# Patient Record
Sex: FEMALE | Race: BLACK OR AFRICAN AMERICAN | HISPANIC OR LATINO | URBAN - METROPOLITAN AREA
[De-identification: names, ages, dates, MRNs, and addresses within clinical notes are randomized per-mention and may not be internally consistent; named-entity substitution may affect disease eponyms.]

---

## 2017-06-12 ENCOUNTER — EMERGENCY (EMERGENCY)
Facility: HOSPITAL | Age: 37
LOS: 0 days | Discharge: HOME | End: 2017-06-12

## 2017-06-12 DIAGNOSIS — O41.00X0 OLIGOHYDRAMNIOS, UNSPECIFIED TRIMESTER, NOT APPLICABLE OR UNSPECIFIED: ICD-10-CM
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
## 2017-06-28 DIAGNOSIS — F10.129 ALCOHOL ABUSE WITH INTOXICATION, UNSPECIFIED: ICD-10-CM

## 2017-06-28 DIAGNOSIS — Z98.890 OTHER SPECIFIED POSTPROCEDURAL STATES: ICD-10-CM

## 2017-09-10 ENCOUNTER — EMERGENCY (EMERGENCY)
Facility: HOSPITAL | Age: 37
LOS: 0 days | Discharge: HOME | End: 2017-09-10

## 2017-09-10 DIAGNOSIS — N83.201 UNSPECIFIED OVARIAN CYST, RIGHT SIDE: ICD-10-CM

## 2017-09-10 DIAGNOSIS — Z98.890 OTHER SPECIFIED POSTPROCEDURAL STATES: ICD-10-CM

## 2017-09-10 DIAGNOSIS — R10.33 PERIUMBILICAL PAIN: ICD-10-CM

## 2017-09-10 DIAGNOSIS — Z79.899 OTHER LONG TERM (CURRENT) DRUG THERAPY: ICD-10-CM

## 2017-09-10 DIAGNOSIS — O41.00X0 OLIGOHYDRAMNIOS, UNSPECIFIED TRIMESTER, NOT APPLICABLE OR UNSPECIFIED: ICD-10-CM

## 2017-10-20 ENCOUNTER — EMERGENCY (EMERGENCY)
Facility: HOSPITAL | Age: 37
LOS: 0 days | Discharge: HOME | End: 2017-10-20

## 2017-10-20 DIAGNOSIS — O41.00X0 OLIGOHYDRAMNIOS, UNSPECIFIED TRIMESTER, NOT APPLICABLE OR UNSPECIFIED: ICD-10-CM

## 2017-10-20 DIAGNOSIS — J02.9 ACUTE PHARYNGITIS, UNSPECIFIED: ICD-10-CM

## 2017-10-20 DIAGNOSIS — B34.9 VIRAL INFECTION, UNSPECIFIED: ICD-10-CM

## 2017-10-20 DIAGNOSIS — Z98.890 OTHER SPECIFIED POSTPROCEDURAL STATES: ICD-10-CM

## 2018-02-05 ENCOUNTER — EMERGENCY (EMERGENCY)
Facility: HOSPITAL | Age: 38
LOS: 0 days | Discharge: HOME | End: 2018-02-05

## 2018-02-05 VITALS
HEART RATE: 79 BPM | SYSTOLIC BLOOD PRESSURE: 121 MMHG | RESPIRATION RATE: 17 BRPM | DIASTOLIC BLOOD PRESSURE: 56 MMHG | OXYGEN SATURATION: 100 % | TEMPERATURE: 99 F
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
## 2018-02-05 DIAGNOSIS — M79.672 PAIN IN LEFT FOOT: ICD-10-CM
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
## 2018-02-05 DIAGNOSIS — S92.902A UNSPECIFIED FRACTURE OF LEFT FOOT, INITIAL ENCOUNTER FOR CLOSED FRACTURE: ICD-10-CM

## 2018-02-05 RX ORDER — IBUPROFEN 200 MG
600 TABLET ORAL ONCE
Qty: 0 | Refills: 0 | Status: COMPLETED | OUTPATIENT
Start: 2018-02-05 | End: 2018-02-05

## 2018-02-05 RX ORDER — IBUPROFEN 200 MG
1 TABLET ORAL
Qty: 42 | Refills: 0 | OUTPATIENT
Start: 2018-02-05 | End: 2018-02-18

## 2018-02-05 RX ADMIN — Medication 600 MILLIGRAM(S): at 18:44

## 2018-02-05 NOTE — ED PROVIDER NOTE - OBJECTIVE STATEMENT
37 y.o. female presented to ER c/o pain dorsum Left foot after hitting foot on bed frame last night.  Pain progressed throughout the day.  Pt denies weakness, paresthesias, other injury.

## 2018-02-05 NOTE — ED PROVIDER NOTE - MUSCULOSKELETAL, MLM
LEFT FOOT:  (+) tenderness, swelling, and ecchymosis dorsum foot base 1st and second metatarsal; limited ROM due to pain; no motor or sensory deficit; pedal pulses 2+

## 2018-02-05 NOTE — ED PROCEDURE NOTE - CPROC ED POST PROC CARE GUIDE1
Elevate the injured extremity as instructed./Keep the cast/splint/dressing clean and dry./Verbal/written post procedure instructions were given to patient/caregiver.

## 2018-07-08 ENCOUNTER — EMERGENCY (EMERGENCY)
Facility: HOSPITAL | Age: 38
LOS: 0 days | Discharge: HOME | End: 2018-07-08
Admitting: PHYSICIAN ASSISTANT

## 2018-07-08 VITALS
RESPIRATION RATE: 18 BRPM | TEMPERATURE: 98 F | HEART RATE: 74 BPM | SYSTOLIC BLOOD PRESSURE: 140 MMHG | OXYGEN SATURATION: 100 % | DIASTOLIC BLOOD PRESSURE: 76 MMHG

## 2018-07-08 DIAGNOSIS — Z79.899 OTHER LONG TERM (CURRENT) DRUG THERAPY: ICD-10-CM

## 2018-07-08 DIAGNOSIS — R11.0 NAUSEA: ICD-10-CM

## 2018-07-08 NOTE — ED PROVIDER NOTE - PHYSICAL EXAMINATION
Gen: Alert, NAD, well appearing  Head: NC, AT, PERRL, EOMI, normal lids/conjunctiva  Neck: +supple, no tenderness/meningismus,  Pulm: Bilateral BS, normal resp effort, no wheeze/stridor/retractions  CV: RRR, no murmer  Abd: soft, NT/ND,  no organomegaly  Mskel: no edema/erythema/cyanosis  Skin: no rash, warm/dry  Neuro: AAOx3, no sensory/motor deficits

## 2018-07-08 NOTE — ED ADULT NURSE NOTE - OBJECTIVE STATEMENT
Patient c/o nausea x 1 week. States she had 1 episode of lower abdominal pain yesterday which resolved on its own. LMP 6/13 believes she might be pregnant Patient c/o nausea x 1 week. LMP 6/13 believes she might be pregnant

## 2018-07-08 NOTE — ED PROVIDER NOTE - OBJECTIVE STATEMENT
38 yo female requesting pregnancy test. Patient states she has had some nausea for a few days and thinks she may be pregnant. LMP 6/13/18. No f/c/v/c/d. No abdominal pains. No vaginal bleeding. 36 yo female requesting pregnancy test. Patient states she has had some nausea for a few days and thinks she may be pregnant. LMP 6/13/18. No f/c/v/c/d. No HA, CP,  or abdominal pains. No vaginal bleeding.

## 2018-07-08 NOTE — ED PROVIDER NOTE - NS ED ROS FT
Review of Systems    Constitutional: (-) fever  Cardiovascular: (-) chest pain, (-) syncope  Respiratory: (-) cough, (-) shortness of breath  Gastrointestinal: (-) vomiting, (-) diarrhea  Musculoskeletal: (-) neck pain, (-) back pain, (-) joint pain  Integumentary: (-) rash, (-) edema  Neurological: (-) headache, (-) altered mental status

## 2018-07-08 NOTE — ED ADULT NURSE NOTE - CHPI ED SYMPTOMS NEG
no hematuria/no abdominal distension/no blood in stool/no burning urination/no dysuria/no fever/no chills/no diarrhea

## 2018-09-04 ENCOUNTER — EMERGENCY (EMERGENCY)
Facility: HOSPITAL | Age: 38
LOS: 0 days | Discharge: LEFT BEFORE DISCHARGE | End: 2018-09-04
Attending: EMERGENCY MEDICINE | Admitting: EMERGENCY MEDICINE

## 2018-09-04 VITALS
TEMPERATURE: 99 F | HEART RATE: 87 BPM | DIASTOLIC BLOOD PRESSURE: 69 MMHG | WEIGHT: 190.04 LBS | SYSTOLIC BLOOD PRESSURE: 137 MMHG | OXYGEN SATURATION: 100 % | RESPIRATION RATE: 20 BRPM

## 2018-09-04 DIAGNOSIS — R10.9 UNSPECIFIED ABDOMINAL PAIN: ICD-10-CM

## 2018-09-04 DIAGNOSIS — R53.1 WEAKNESS: ICD-10-CM

## 2018-09-04 NOTE — ED PROVIDER NOTE - PROGRESS NOTE DETAILS
searched for pt multiple times in ED. not in designated location Not seen by me, eloped before evaluation.

## 2018-09-05 ENCOUNTER — EMERGENCY (EMERGENCY)
Facility: HOSPITAL | Age: 38
LOS: 0 days | Discharge: LEFT BEFORE DISCHARGE | End: 2018-09-05
Attending: EMERGENCY MEDICINE | Admitting: EMERGENCY MEDICINE

## 2018-09-05 VITALS
SYSTOLIC BLOOD PRESSURE: 123 MMHG | RESPIRATION RATE: 18 BRPM | DIASTOLIC BLOOD PRESSURE: 56 MMHG | TEMPERATURE: 99 F | HEART RATE: 87 BPM | OXYGEN SATURATION: 96 %

## 2018-09-05 VITALS
OXYGEN SATURATION: 99 % | RESPIRATION RATE: 18 BRPM | SYSTOLIC BLOOD PRESSURE: 134 MMHG | DIASTOLIC BLOOD PRESSURE: 65 MMHG | HEART RATE: 74 BPM | TEMPERATURE: 98 F

## 2018-09-05 DIAGNOSIS — Z3A.10 10 WEEKS GESTATION OF PREGNANCY: ICD-10-CM

## 2018-09-05 DIAGNOSIS — O99.89 OTHER SPECIFIED DISEASES AND CONDITIONS COMPLICATING PREGNANCY, CHILDBIRTH AND THE PUERPERIUM: ICD-10-CM

## 2018-09-05 DIAGNOSIS — Z79.1 LONG TERM (CURRENT) USE OF NON-STEROIDAL ANTI-INFLAMMATORIES (NSAID): ICD-10-CM

## 2018-09-05 DIAGNOSIS — R93.5 ABNORMAL FINDINGS ON DIAGNOSTIC IMAGING OF OTHER ABDOMINAL REGIONS, INCLUDING RETROPERITONEUM: ICD-10-CM

## 2018-09-05 PROBLEM — Z00.00 ENCOUNTER FOR PREVENTIVE HEALTH EXAMINATION: Status: ACTIVE | Noted: 2018-09-05

## 2018-09-05 LAB
ALBUMIN SERPL ELPH-MCNC: 4.5 G/DL — SIGNIFICANT CHANGE UP (ref 3.5–5.2)
ALP SERPL-CCNC: 66 U/L — SIGNIFICANT CHANGE UP (ref 30–115)
ALT FLD-CCNC: 17 U/L — SIGNIFICANT CHANGE UP (ref 0–41)
ANION GAP SERPL CALC-SCNC: 13 MMOL/L — SIGNIFICANT CHANGE UP (ref 7–14)
APPEARANCE UR: CLEAR — SIGNIFICANT CHANGE UP
AST SERPL-CCNC: 16 U/L — SIGNIFICANT CHANGE UP (ref 0–41)
BASOPHILS # BLD AUTO: 0.06 K/UL — SIGNIFICANT CHANGE UP (ref 0–0.2)
BASOPHILS NFR BLD AUTO: 0.6 % — SIGNIFICANT CHANGE UP (ref 0–1)
BILIRUB SERPL-MCNC: 0.2 MG/DL — SIGNIFICANT CHANGE UP (ref 0.2–1.2)
BILIRUB UR-MCNC: NEGATIVE — SIGNIFICANT CHANGE UP
BLD GP AB SCN SERPL QL: SIGNIFICANT CHANGE UP
BUN SERPL-MCNC: 12 MG/DL — SIGNIFICANT CHANGE UP (ref 10–20)
CALCIUM SERPL-MCNC: 9.7 MG/DL — SIGNIFICANT CHANGE UP (ref 8.5–10.1)
CHLORIDE SERPL-SCNC: 96 MMOL/L — LOW (ref 98–110)
CO2 SERPL-SCNC: 27 MMOL/L — SIGNIFICANT CHANGE UP (ref 17–32)
COLOR SPEC: YELLOW — SIGNIFICANT CHANGE UP
CREAT SERPL-MCNC: 0.7 MG/DL — SIGNIFICANT CHANGE UP (ref 0.7–1.5)
DIFF PNL FLD: NEGATIVE — SIGNIFICANT CHANGE UP
EOSINOPHIL # BLD AUTO: 0.11 K/UL — SIGNIFICANT CHANGE UP (ref 0–0.7)
EOSINOPHIL NFR BLD AUTO: 1.1 % — SIGNIFICANT CHANGE UP (ref 0–8)
GLUCOSE SERPL-MCNC: 85 MG/DL — SIGNIFICANT CHANGE UP (ref 70–99)
GLUCOSE UR QL: NEGATIVE MG/DL — SIGNIFICANT CHANGE UP
HCG SERPL-ACNC: 1977 MIU/ML — HIGH
HCT VFR BLD CALC: 39.6 % — SIGNIFICANT CHANGE UP (ref 37–47)
HGB BLD-MCNC: 13 G/DL — SIGNIFICANT CHANGE UP (ref 12–16)
IMM GRANULOCYTES NFR BLD AUTO: 0.3 % — SIGNIFICANT CHANGE UP (ref 0.1–0.3)
KETONES UR-MCNC: NEGATIVE — SIGNIFICANT CHANGE UP
LEUKOCYTE ESTERASE UR-ACNC: NEGATIVE — SIGNIFICANT CHANGE UP
LYMPHOCYTES # BLD AUTO: 2.87 K/UL — SIGNIFICANT CHANGE UP (ref 1.2–3.4)
LYMPHOCYTES # BLD AUTO: 30 % — SIGNIFICANT CHANGE UP (ref 20.5–51.1)
MCHC RBC-ENTMCNC: 26.7 PG — LOW (ref 27–31)
MCHC RBC-ENTMCNC: 32.8 G/DL — SIGNIFICANT CHANGE UP (ref 32–37)
MCV RBC AUTO: 81.3 FL — SIGNIFICANT CHANGE UP (ref 81–99)
MONOCYTES # BLD AUTO: 0.89 K/UL — HIGH (ref 0.1–0.6)
MONOCYTES NFR BLD AUTO: 9.3 % — SIGNIFICANT CHANGE UP (ref 1.7–9.3)
NEUTROPHILS # BLD AUTO: 5.61 K/UL — SIGNIFICANT CHANGE UP (ref 1.4–6.5)
NEUTROPHILS NFR BLD AUTO: 58.7 % — SIGNIFICANT CHANGE UP (ref 42.2–75.2)
NITRITE UR-MCNC: NEGATIVE — SIGNIFICANT CHANGE UP
NRBC # BLD: 0 /100 WBCS — SIGNIFICANT CHANGE UP (ref 0–0)
PH UR: 6.5 — SIGNIFICANT CHANGE UP (ref 5–8)
PLATELET # BLD AUTO: 323 K/UL — SIGNIFICANT CHANGE UP (ref 130–400)
POTASSIUM SERPL-MCNC: 4 MMOL/L — SIGNIFICANT CHANGE UP (ref 3.5–5)
POTASSIUM SERPL-SCNC: 4 MMOL/L — SIGNIFICANT CHANGE UP (ref 3.5–5)
PROT SERPL-MCNC: 7.4 G/DL — SIGNIFICANT CHANGE UP (ref 6–8)
PROT UR-MCNC: NEGATIVE MG/DL — SIGNIFICANT CHANGE UP
RBC # BLD: 4.87 M/UL — SIGNIFICANT CHANGE UP (ref 4.2–5.4)
RBC # FLD: 13.1 % — SIGNIFICANT CHANGE UP (ref 11.5–14.5)
SODIUM SERPL-SCNC: 136 MMOL/L — SIGNIFICANT CHANGE UP (ref 135–146)
SP GR SPEC: 1.01 — SIGNIFICANT CHANGE UP (ref 1.01–1.03)
TYPE + AB SCN PNL BLD: SIGNIFICANT CHANGE UP
UROBILINOGEN FLD QL: 0.2 MG/DL — SIGNIFICANT CHANGE UP (ref 0.2–0.2)
WBC # BLD: 9.57 K/UL — SIGNIFICANT CHANGE UP (ref 4.8–10.8)
WBC # FLD AUTO: 9.57 K/UL — SIGNIFICANT CHANGE UP (ref 4.8–10.8)

## 2018-09-05 RX ORDER — SODIUM CHLORIDE 9 MG/ML
1000 INJECTION INTRAMUSCULAR; INTRAVENOUS; SUBCUTANEOUS ONCE
Qty: 0 | Refills: 0 | Status: COMPLETED | OUTPATIENT
Start: 2018-09-05 | End: 2018-09-05

## 2018-09-05 RX ADMIN — SODIUM CHLORIDE 1000 MILLILITER(S): 9 INJECTION INTRAMUSCULAR; INTRAVENOUS; SUBCUTANEOUS at 16:40

## 2018-09-05 NOTE — ED PROVIDER NOTE - OBJECTIVE STATEMENT
36 y/o F,  - currently states that she is 10 weeks pregnant by dates - s/p two elective abortions and one miscarraige, presents to the ED with complaints of abdominal cramping x two days. She denies any vaginal bleeding, back pain, urinary symptoms, nausea, vomiting, fever, chills, chest pain and dyspnea. She has not yet seen an obstetrician for her pregnancy however [facility] in [facility] for 2018.

## 2018-09-05 NOTE — ED PROVIDER NOTE - MEDICAL DECISION MAKING DETAILS
patient w hcg and US now c/w LMP date. does not want to stay for further obgyn evaluation. understands risks of illness or death without further eval. f/u [facility]. The patient wishes to leave against medical advice.  I have discussed the risks, benefits and alternatives (including the possibility of worsening of disease, pain, permanent disability, and/or death) with the patient and his/her family (if available).  The patient voices understanding of these risks, benefits, and alternatives and still wishes to sign out against medical advice.  The patient is awake, alert, oriented  x 3 and has demonstrated capacity to refuse/direct care.  I have advised the patient that they can and should return immediately should they develop any worse/different/additional symptoms, or if they change their mind and want to continue their care.

## 2018-09-05 NOTE — ED PROVIDER NOTE - PROGRESS NOTE DETAILS
patient wants to leave prior to obgyn evaluation, she understands that hcg and US do not corrleate with stated date of LMP, cannot definitively r/o ectopic pregnancy or other pathology. she has appointment at [facility] and will follow up there. Strict return precautions given. will sanjeeva The patient wishes to leave against medical advice.  I have discussed the risks, benefits and alternatives (including the possibility of worsening of disease, pain, permanent disability, and/or death) with the patient and his/her family (if available).  The patient voices understanding of these risks, benefits, and alternatives and still wishes to sign out against medical advice.  The patient is awake, alert, oriented  x 3 and has demonstrated capacity to refuse/direct care.  I have advised the patient that they can and should return immediately should they develop any worse/different/additional symptoms, or if they change their mind and want to continue their care.

## 2018-09-05 NOTE — ED PROVIDER NOTE - ATTENDING CONTRIBUTION TO CARE
37y f  at 10 weeks gestation (LMP  but states she also bled for one day ) presents for dehydration, nausea, general body aches, breast aches. Pregnancy has not been confirmed by US. + abdominal cramping which has since resolved. No bleeding. No HA, dizziness, CP, SOB. NO vomiting or diarrhea. No fever or chills. Exam: WDWN NAD comfortable appearing and conversing appropriately. NCAT neck FROM no ttp and no meningismus. Skin warm and dry. HEENT WNL, MMM. S1S2 RRR, equal pulses b/l, lungs CTAB, no w/r/r, abdomen soft NTND, no r/g, no CVAT, no suprapubic ttp. No LE edema. A&O, normal strength and sensation, neuro nonfocal. A/P - early pregnancy - labs, imaging, hydration, reassess.

## 2018-09-05 NOTE — ED ADULT NURSE NOTE - OBJECTIVE STATEMENT
Pt Presents to ED c/o lower abdominal cramping. Pt reports + pregnancy test at home. LMP June as per pt. No s/s distress onserved.

## 2018-09-05 NOTE — ED ADULT NURSE NOTE - NSIMPLEMENTINTERV_GEN_ALL_ED
Implemented All Universal Safety Interventions:  [place] to call system. Call bell, personal items and telephone within reach. Instruct patient to call for assistance. Room bathroom lighting operational. Non-slip footwear when patient is off stretcher. Physically safe environment: no spills, clutter or unnecessary equipment. Stretcher in lowest position, wheels locked, appropriate side rails in place.

## 2018-09-14 ENCOUNTER — APPOINTMENT (OUTPATIENT)
Dept: OBGYN | Facility: CLINIC | Age: 38
End: 2018-09-14

## 2018-10-14 ENCOUNTER — EMERGENCY (EMERGENCY)
Facility: HOSPITAL | Age: 38
LOS: 0 days | Discharge: LEFT BEFORE DISCHARGE | End: 2018-10-14
Attending: EMERGENCY MEDICINE | Admitting: EMERGENCY MEDICINE

## 2018-10-14 VITALS
HEART RATE: 84 BPM | WEIGHT: 197.09 LBS | SYSTOLIC BLOOD PRESSURE: 125 MMHG | DIASTOLIC BLOOD PRESSURE: 60 MMHG | TEMPERATURE: 97 F | HEIGHT: 65 IN | RESPIRATION RATE: 18 BRPM | OXYGEN SATURATION: 100 %

## 2018-10-14 DIAGNOSIS — O20.8 OTHER HEMORRHAGE IN EARLY PREGNANCY: ICD-10-CM

## 2018-10-14 DIAGNOSIS — Z79.1 LONG TERM (CURRENT) USE OF NON-STEROIDAL ANTI-INFLAMMATORIES (NSAID): ICD-10-CM

## 2018-10-14 LAB
ALBUMIN SERPL ELPH-MCNC: 4.4 G/DL — SIGNIFICANT CHANGE UP (ref 3.5–5.2)
ALP SERPL-CCNC: 64 U/L — SIGNIFICANT CHANGE UP (ref 30–115)
ALT FLD-CCNC: 17 U/L — SIGNIFICANT CHANGE UP (ref 0–41)
ANION GAP SERPL CALC-SCNC: 12 MMOL/L — SIGNIFICANT CHANGE UP (ref 7–14)
APPEARANCE UR: ABNORMAL
AST SERPL-CCNC: 16 U/L — SIGNIFICANT CHANGE UP (ref 0–41)
BACTERIA # UR AUTO: ABNORMAL /HPF
BASOPHILS # BLD AUTO: 0.07 K/UL — SIGNIFICANT CHANGE UP (ref 0–0.2)
BASOPHILS NFR BLD AUTO: 0.6 % — SIGNIFICANT CHANGE UP (ref 0–1)
BILIRUB SERPL-MCNC: <0.2 MG/DL — SIGNIFICANT CHANGE UP (ref 0.2–1.2)
BILIRUB UR-MCNC: NEGATIVE — SIGNIFICANT CHANGE UP
BLD GP AB SCN SERPL QL: SIGNIFICANT CHANGE UP
BUN SERPL-MCNC: 12 MG/DL — SIGNIFICANT CHANGE UP (ref 10–20)
CALCIUM SERPL-MCNC: 9.5 MG/DL — SIGNIFICANT CHANGE UP (ref 8.5–10.1)
CHLORIDE SERPL-SCNC: 97 MMOL/L — LOW (ref 98–110)
CO2 SERPL-SCNC: 28 MMOL/L — SIGNIFICANT CHANGE UP (ref 17–32)
COLOR SPEC: SIGNIFICANT CHANGE UP
CREAT SERPL-MCNC: 0.7 MG/DL — SIGNIFICANT CHANGE UP (ref 0.7–1.5)
DIFF PNL FLD: ABNORMAL
EOSINOPHIL # BLD AUTO: 0.27 K/UL — SIGNIFICANT CHANGE UP (ref 0–0.7)
EOSINOPHIL NFR BLD AUTO: 2.4 % — SIGNIFICANT CHANGE UP (ref 0–8)
GLUCOSE SERPL-MCNC: 95 MG/DL — SIGNIFICANT CHANGE UP (ref 70–99)
GLUCOSE UR QL: NEGATIVE MG/DL — SIGNIFICANT CHANGE UP
HCG SERPL-ACNC: HIGH MIU/ML
HCT VFR BLD CALC: 39.3 % — SIGNIFICANT CHANGE UP (ref 37–47)
HGB BLD-MCNC: 13 G/DL — SIGNIFICANT CHANGE UP (ref 12–16)
IMM GRANULOCYTES NFR BLD AUTO: 0.4 % — HIGH (ref 0.1–0.3)
KETONES UR-MCNC: NEGATIVE — SIGNIFICANT CHANGE UP
LEUKOCYTE ESTERASE UR-ACNC: ABNORMAL
LYMPHOCYTES # BLD AUTO: 3.76 K/UL — HIGH (ref 1.2–3.4)
LYMPHOCYTES # BLD AUTO: 33.6 % — SIGNIFICANT CHANGE UP (ref 20.5–51.1)
MCHC RBC-ENTMCNC: 27.1 PG — SIGNIFICANT CHANGE UP (ref 27–31)
MCHC RBC-ENTMCNC: 33.1 G/DL — SIGNIFICANT CHANGE UP (ref 32–37)
MCV RBC AUTO: 81.9 FL — SIGNIFICANT CHANGE UP (ref 81–99)
MONOCYTES # BLD AUTO: 0.82 K/UL — HIGH (ref 0.1–0.6)
MONOCYTES NFR BLD AUTO: 7.3 % — SIGNIFICANT CHANGE UP (ref 1.7–9.3)
NEUTROPHILS # BLD AUTO: 6.22 K/UL — SIGNIFICANT CHANGE UP (ref 1.4–6.5)
NEUTROPHILS NFR BLD AUTO: 55.7 % — SIGNIFICANT CHANGE UP (ref 42.2–75.2)
NITRITE UR-MCNC: NEGATIVE — SIGNIFICANT CHANGE UP
NRBC # BLD: 0 /100 WBCS — SIGNIFICANT CHANGE UP (ref 0–0)
PH UR: 6 — SIGNIFICANT CHANGE UP (ref 5–8)
PLATELET # BLD AUTO: 315 K/UL — SIGNIFICANT CHANGE UP (ref 130–400)
POTASSIUM SERPL-MCNC: 4.2 MMOL/L — SIGNIFICANT CHANGE UP (ref 3.5–5)
POTASSIUM SERPL-SCNC: 4.2 MMOL/L — SIGNIFICANT CHANGE UP (ref 3.5–5)
PROT SERPL-MCNC: 7.6 G/DL — SIGNIFICANT CHANGE UP (ref 6–8)
PROT UR-MCNC: 100 MG/DL
RBC # BLD: 4.8 M/UL — SIGNIFICANT CHANGE UP (ref 4.2–5.4)
RBC # FLD: 13.5 % — SIGNIFICANT CHANGE UP (ref 11.5–14.5)
RBC CASTS # UR COMP ASSIST: >50 /HPF
SODIUM SERPL-SCNC: 137 MMOL/L — SIGNIFICANT CHANGE UP (ref 135–146)
SP GR SPEC: 1.02 — SIGNIFICANT CHANGE UP (ref 1.01–1.03)
TYPE + AB SCN PNL BLD: SIGNIFICANT CHANGE UP
UROBILINOGEN FLD QL: 0.2 MG/DL — SIGNIFICANT CHANGE UP (ref 0.2–0.2)
WBC # BLD: 11.18 K/UL — HIGH (ref 4.8–10.8)
WBC # FLD AUTO: 11.18 K/UL — HIGH (ref 4.8–10.8)
WBC UR QL: SIGNIFICANT CHANGE UP /HPF

## 2018-10-14 NOTE — ED PROVIDER NOTE - NSFOLLOWUPCLINICS_GEN_ALL_ED_FT
An OB/GYN physician  Obstetrics & Gynecology  .  [place]   Phone:   Fax:   Follow Up Time:     [facility]  OB/GYN  [address] [place] [address]  Phone: [phone]  Fax:   Follow Up Time:
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
## 2018-10-14 NOTE — ED PROVIDER NOTE - OBJECTIVE STATEMENT
36 yo F [id number], currently pregnant presents for evaluation of vaginal bleeding, onset tonight, associated with abdominal cramping, passing clots and membranes. No fever, no chills, no headache, no nausea, no vomiting, no diarrhea, no back pain, no chest pain. Patient states that this feels similar to her previous miscarriage. She states that in the ED when she went to give urine she passed more membranes. She states she was seen 3 days ago for a visit, had an ultrasound done but was too early to tell. No other symptoms reported. 36 yo F [id number], currently pregnant presents for evaluation of vaginal bleeding, onset tonight, associated with abdominal cramping, passing clots and membranes. No fever, no chills, no headache, no nausea, no vomiting, no diarrhea, no back pain, no chest pain. Patient states that this feels similar to her previous miscarriage. She states that in the ED when she went to give urine she passed more membranes. She states she was seen 3 days ago for a visit, had an ultrasound done but was too early to tell. No other symptoms reported.

## 2018-10-14 NOTE — ED ADULT NURSE NOTE - NSIMPLEMENTINTERV_GEN_ALL_ED
Implemented All Universal Safety Interventions:  [place] to call system. Call bell, personal items and telephone within reach. Instruct patient to call for assistance. Room bathroom lighting operational. Non-slip footwear when patient is off stretcher. Physically safe environment: no spills, clutter or unnecessary equipment. Stretcher in lowest position, wheels locked, appropriate side rails in place.

## 2018-10-14 NOTE — ED PROVIDER NOTE - PROGRESS NOTE DETAILS
While in the ED patient states she had to use the restroom and had another episode of passing clots. Will get blood work signout to Dr. Arnold.  f/u imaging and reassess. Signout to Dr. LYNETTE Naranjo for continued care Sign out received from Dr. Romo. Pt seen and evaluated. Will wait for US. rob s/o from Dr. Reno

## 2018-10-14 NOTE — ED PROVIDER NOTE - ATTENDING CONTRIBUTION TO CARE
here because she was spotting and cramping yesterday and tonight when she went to the bathroom and saw a "sac" like looking thing come out.  Seen this past thursday by her OB and had an US and they were not sure if this was twins or something else.  here because she was spotting and cramping yesterday and tonight when she went to the bathroom and saw a "sac" like looking thing come out.  Pt feels as though she has passed out all of the miscarriage.  Pt has had many miscarriages in the past and says this feels the same and the tissues she has seen is the same as what she has seen in the past.  Pt went to her OB last wed/thursday and was told that she had early twin gestation, less than 8 weeks.  no fever. no dysuria. mild ap.  EXAM: well appearing. NAD. s1s2, reg. CTAB. abd soft, nd, nt.  Pelvic exam with blood in vaginal vault.  Pt could not tolerate the speculum exam and ended the exam early prior to visualization of the cervical os.  P: labs, US  here because she was spotting and cramping yesterday and tonight when she went to the bathroom and saw a "sac" like looking thing come out.  Pt feels as though she has passed out all of the miscarriage.  Pt has had many miscarriages in the past and says this feels the same and the tissues she has seen is the same as what she has seen in the past.  Pt went to her OB last wed/thursday and was told that she had early twin gestation, less than 8 weeks.  no fever. no dysuria. mild ap.  EXAM: well appearing. NAD. s1s2, reg. CTAB. abd soft, nd, nt.  Pelvic exam with blood in vaginal vault.  Performed by Dr. Romo and me.  Pt could not tolerate the speculum exam and ended the exam early prior to visualization of the cervical os.  P: labs, US

## 2018-10-14 NOTE — ED PROVIDER NOTE - GENITOURINARY, MLM
Chaperone Dr. Trudi Reno, speculum there is blood in the vaginal vault unable to visualize cervix, patient requested exam stopped due to discomfort, bimanual exam declined

## 2018-10-15 LAB
CULTURE RESULTS: NO GROWTH — SIGNIFICANT CHANGE UP
SPECIMEN SOURCE: SIGNIFICANT CHANGE UP

## 2019-01-27 ENCOUNTER — EMERGENCY (EMERGENCY)
Facility: HOSPITAL | Age: 39
LOS: 0 days | Discharge: LEFT BEFORE DISCHARGE | End: 2019-01-27
Attending: EMERGENCY MEDICINE | Admitting: EMERGENCY MEDICINE

## 2019-01-27 VITALS
DIASTOLIC BLOOD PRESSURE: 63 MMHG | TEMPERATURE: 97 F | RESPIRATION RATE: 18 BRPM | HEART RATE: 100 BPM | SYSTOLIC BLOOD PRESSURE: 122 MMHG | OXYGEN SATURATION: 100 %
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
## 2019-01-27 DIAGNOSIS — T74.11XA ADULT PHYSICAL ABUSE, CONFIRMED, INITIAL ENCOUNTER: ICD-10-CM

## 2019-01-27 DIAGNOSIS — Z79.1 LONG TERM (CURRENT) USE OF NON-STEROIDAL ANTI-INFLAMMATORIES (NSAID): ICD-10-CM

## 2019-01-27 DIAGNOSIS — Z3A.01 LESS THAN 8 WEEKS GESTATION OF PREGNANCY: ICD-10-CM

## 2019-01-27 DIAGNOSIS — R10.9 UNSPECIFIED ABDOMINAL PAIN: ICD-10-CM
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
## 2019-01-27 DIAGNOSIS — O9A.211 INJURY, POISONING AND CERTAIN OTHER CONSEQUENCES OF EXTERNAL CAUSES COMPLICATING PREGNANCY, FIRST TRIMESTER: ICD-10-CM

## 2019-01-27 DIAGNOSIS — H92.01 OTALGIA, RIGHT EAR: ICD-10-CM
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
## 2019-01-27 LAB
ANION GAP SERPL CALC-SCNC: 15 MMOL/L — HIGH (ref 7–14)
APPEARANCE UR: CLEAR — SIGNIFICANT CHANGE UP
BASOPHILS # BLD AUTO: 0.09 K/UL — SIGNIFICANT CHANGE UP (ref 0–0.2)
BASOPHILS NFR BLD AUTO: 0.9 % — SIGNIFICANT CHANGE UP (ref 0–1)
BILIRUB UR-MCNC: NEGATIVE — SIGNIFICANT CHANGE UP
BLD GP AB SCN SERPL QL: SIGNIFICANT CHANGE UP
BUN SERPL-MCNC: 12 MG/DL — SIGNIFICANT CHANGE UP (ref 10–20)
CALCIUM SERPL-MCNC: 9.5 MG/DL — SIGNIFICANT CHANGE UP (ref 8.5–10.1)
CHLORIDE SERPL-SCNC: 100 MMOL/L — SIGNIFICANT CHANGE UP (ref 98–110)
CO2 SERPL-SCNC: 26 MMOL/L — SIGNIFICANT CHANGE UP (ref 17–32)
COLOR SPEC: YELLOW — SIGNIFICANT CHANGE UP
CREAT SERPL-MCNC: 0.9 MG/DL — SIGNIFICANT CHANGE UP (ref 0.7–1.5)
DIFF PNL FLD: NEGATIVE — SIGNIFICANT CHANGE UP
EOSINOPHIL # BLD AUTO: 0.1 K/UL — SIGNIFICANT CHANGE UP (ref 0–0.7)
EOSINOPHIL NFR BLD AUTO: 1 % — SIGNIFICANT CHANGE UP (ref 0–8)
GLUCOSE SERPL-MCNC: 97 MG/DL — SIGNIFICANT CHANGE UP (ref 70–99)
GLUCOSE UR QL: NEGATIVE MG/DL — SIGNIFICANT CHANGE UP
HCG SERPL-ACNC: 80.6 MIU/ML — HIGH
HCT VFR BLD CALC: 39.9 % — SIGNIFICANT CHANGE UP (ref 37–47)
HGB BLD-MCNC: 13.2 G/DL — SIGNIFICANT CHANGE UP (ref 12–16)
IMM GRANULOCYTES NFR BLD AUTO: 0.3 % — SIGNIFICANT CHANGE UP (ref 0.1–0.3)
KETONES UR-MCNC: NEGATIVE — SIGNIFICANT CHANGE UP
LEUKOCYTE ESTERASE UR-ACNC: NEGATIVE — SIGNIFICANT CHANGE UP
LYMPHOCYTES # BLD AUTO: 2.88 K/UL — SIGNIFICANT CHANGE UP (ref 1.2–3.4)
LYMPHOCYTES # BLD AUTO: 28.5 % — SIGNIFICANT CHANGE UP (ref 20.5–51.1)
MCHC RBC-ENTMCNC: 27 PG — SIGNIFICANT CHANGE UP (ref 27–31)
MCHC RBC-ENTMCNC: 33.1 G/DL — SIGNIFICANT CHANGE UP (ref 32–37)
MCV RBC AUTO: 81.6 FL — SIGNIFICANT CHANGE UP (ref 81–99)
MONOCYTES # BLD AUTO: 0.88 K/UL — HIGH (ref 0.1–0.6)
MONOCYTES NFR BLD AUTO: 8.7 % — SIGNIFICANT CHANGE UP (ref 1.7–9.3)
NEUTROPHILS # BLD AUTO: 6.11 K/UL — SIGNIFICANT CHANGE UP (ref 1.4–6.5)
NEUTROPHILS NFR BLD AUTO: 60.6 % — SIGNIFICANT CHANGE UP (ref 42.2–75.2)
NITRITE UR-MCNC: NEGATIVE — SIGNIFICANT CHANGE UP
NRBC # BLD: 0 /100 WBCS — SIGNIFICANT CHANGE UP (ref 0–0)
PH UR: 7.5 — SIGNIFICANT CHANGE UP (ref 5–8)
PLATELET # BLD AUTO: 318 K/UL — SIGNIFICANT CHANGE UP (ref 130–400)
POTASSIUM SERPL-MCNC: 4.1 MMOL/L — SIGNIFICANT CHANGE UP (ref 3.5–5)
POTASSIUM SERPL-SCNC: 4.1 MMOL/L — SIGNIFICANT CHANGE UP (ref 3.5–5)
PROT UR-MCNC: NEGATIVE MG/DL — SIGNIFICANT CHANGE UP
RBC # BLD: 4.89 M/UL — SIGNIFICANT CHANGE UP (ref 4.2–5.4)
RBC # FLD: 13.2 % — SIGNIFICANT CHANGE UP (ref 11.5–14.5)
SODIUM SERPL-SCNC: 141 MMOL/L — SIGNIFICANT CHANGE UP (ref 135–146)
SP GR SPEC: 1.01 — SIGNIFICANT CHANGE UP (ref 1.01–1.03)
TYPE + AB SCN PNL BLD: SIGNIFICANT CHANGE UP
UROBILINOGEN FLD QL: 0.2 MG/DL — SIGNIFICANT CHANGE UP (ref 0.2–0.2)
WBC # BLD: 10.09 K/UL — SIGNIFICANT CHANGE UP (ref 4.8–10.8)
WBC # FLD AUTO: 10.09 K/UL — SIGNIFICANT CHANGE UP (ref 4.8–10.8)

## 2019-01-27 RX ORDER — ACETAMINOPHEN 500 MG
650 TABLET ORAL ONCE
Qty: 0 | Refills: 0 | Status: COMPLETED | OUTPATIENT
Start: 2019-01-27 | End: 2019-01-27
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
## 2019-01-27 NOTE — ED PROVIDER NOTE - PHYSICAL EXAMINATION
CONSTITUTIONAL: well developed; well nourished; well appearing in no acute distress  HEAD: normocephalic; atraumatic  EYES: PERRL, no conjunctival injection, no scleral icterus  E: b/l TM w/o hemotympanum or Bales's sign, mild swelling at R mastoid w/o sig tenderness  ENT: no nasal discharge; airway clear.  NECK: supple; non tender. + full passive ROM in all directions  CARD: S1, S2 normal; no murmurs, gallops, or rubs. Regular rate and rhythm  RESP: no wheezes, rales or rhonchi. Good air movement bilaterally without significant accessory muscle use  ABD: soft; non-distended; non-tender. No rebound, no guarding, no pulsatile abdominal mass  EXT: moving all extremities spontaneously, normal ROM. No clubbing, cyanosis or edema  SKIN: warm and dry, no lesions noted  NEURO: alert, oriented, CN II-XII grossly intact, motor and sensory grossly intact, speech nonslurred, no focal deficits. GCS 15  PSYCH: calm, cooperative, appropriate, good eye contact, logical thought process, no apparent danger to self or others

## 2019-01-27 NOTE — ED PROVIDER NOTE - NS ED ROS FT
Constitutional: no fevers/chills, no sick contacts  Eyes: No visual changes, eye pain or discharge. No photophobia  ENMT: No hearing changes, pain, discharge or infections, +tinnitus and R ear pain. No sore throat or drooling.  Neck:  No neck pain or stiffness. No limited ROM  Cardiac: No SOB or edema. No chest pain with exertion.  Respiratory: No cough or respiratory distress. No hemoptysis. No history of asthma or RAD  GI: No nausea, vomiting, diarrhea, +abdominal pain  : No dysuria, frequency or burning. No discharge  MS: No myalgia, muscle weakness, joint pain or back pain  Neuro: No headache or weakness. No LOC  Skin: No skin rash  Endo: no diabetes or thyroid dysfunction  Heme: no abnormal bleeding or clotting  Except as documented in the HPI, all other systems are negative
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
## 2019-01-27 NOTE — ED ADULT NURSE NOTE - NS ED NURSE ELOPE COMMENTS
Pt does not wish to wait for US results, RN educated pt on the need to stay for test results and risks involved with leaving, pt verbalized understanding but left ED. IV site removed by pt prior to leaving, bleeding controlled.
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
## 2019-01-27 NOTE — ED ADULT NURSE NOTE - ADDITIONAL COMPLAINTS
Sandra Mane is a 71 y.o. female, who presents with a chief complaint of   Chief Complaint   Patient presents with   • Depression     medication problem   • Wound Infection     Right great toe       HPI     1. Right great toe would.  She scraped the knuckle and it has been draining a little and red.  She has been soaking with epsom salts and washing with hydrogen peroxide.  Denies fevers.    2. Depression.  She has been off the citalopram and bupropion X 2 months due to sedation.  She says that her depression is starting to flare.  She feels weepy and down.  Denies SI.  She would like to restart medication.    The following portions of the patient's history were reviewed and updated as appropriate: allergies, current medications, past family history, past medical history, past social history, past surgical history and problem list.    Allergies: Patient has no known allergies.    Review of Systems   Constitutional: Negative for fever.   Skin: Positive for wound.   Neurological: Negative.    Hematological: Negative.    Psychiatric/Behavioral: Positive for dysphoric mood. Negative for suicidal ideas. The patient is nervous/anxious.              Wt Readings from Last 3 Encounters:   01/17/19 87.5 kg (193 lb)   11/29/18 94.3 kg (208 lb)   11/14/18 90.6 kg (199 lb 12.8 oz)     Temp Readings from Last 3 Encounters:   01/17/19 98.6 °F (37 °C)   11/29/18 98.2 °F (36.8 °C)   11/14/18 98.7 °F (37.1 °C)     BP Readings from Last 3 Encounters:   01/17/19 130/88   11/29/18 130/84   11/14/18 120/60     Pulse Readings from Last 3 Encounters:   11/29/18 69   11/14/18 67   08/27/18 67     Body mass index is 32.12 kg/m².  @LASTSAO2(3)@    Physical Exam   Constitutional: She is oriented to person, place, and time. She appears well-developed and well-nourished. No distress.   HENT:   Head: Normocephalic and atraumatic.   Eyes: Conjunctivae and EOM are normal.   Pulmonary/Chest: Effort normal. No respiratory distress.         Neurological: She is alert and oriented to person, place, and time.   Skin: Skin is warm and dry.   Abrasion right great toe at knuckle.  Mild erythema and warmth of the toe.   Psychiatric: She has a normal mood and affect. Her behavior is normal.   Nursing note and vitals reviewed.      Results for orders placed or performed in visit on 11/19/18   TSH   Result Value Ref Range    TSH 5.190 (H) 0.270 - 4.200 mIU/mL   Lipid Panel With / Chol / HDL Ratio   Result Value Ref Range    Total Cholesterol 156 0 - 200 mg/dL    Triglycerides 129 0 - 150 mg/dL    HDL Cholesterol 47 40 - 60 mg/dL    VLDL Cholesterol 25.8 7 - 27 mg/dL    LDL Cholesterol  83 0 - 100 mg/dL    Chol/HDL Ratio 3.32    Comprehensive Metabolic Panel   Result Value Ref Range    Glucose 95 65 - 99 mg/dL    BUN 15 8 - 23 mg/dL    Creatinine 1.26 (H) 0.57 - 1.00 mg/dL    eGFR Non African Am 42 (L) >60 mL/min/1.73    eGFR African Am 51 (L) >60 mL/min/1.73    BUN/Creatinine Ratio 11.9 7.0 - 25.0    Sodium 148 (H) 136 - 145 mmol/L    Potassium 5.3 (H) 3.5 - 5.2 mmol/L    Chloride 110 (H) 98 - 107 mmol/L    Total CO2 27.8 22.0 - 29.0 mmol/L    Calcium 9.3 8.8 - 10.5 mg/dL    Total Protein 8.6 (H) 6.0 - 8.5 g/dL    Albumin 3.70 3.50 - 5.20 g/dL    Globulin 4.9 gm/dL    A/G Ratio 0.8 g/dL    Total Bilirubin 0.2 0.2 - 1.2 mg/dL    Alkaline Phosphatase 84 40 - 129 U/L    AST (SGOT) 19 5 - 32 U/L    ALT (SGPT) 10 5 - 33 U/L   CBC & Differential   Result Value Ref Range    WBC 6.32 4.80 - 10.80 10*3/mm3    RBC 3.65 (L) 4.20 - 5.40 10*6/mm3    Hemoglobin 10.5 (L) 12.0 - 16.0 g/dL    Hematocrit 34.7 (L) 37.0 - 47.0 %    MCV 95.1 81.0 - 99.0 fL    MCH 28.8 27.0 - 31.0 pg    MCHC 30.3 (L) 31.0 - 37.0 g/dL    RDW 14.6 (H) 11.5 - 14.5 %    Platelets 219 140 - 500 10*3/mm3    Neutrophil Rel % 53.0 45.0 - 70.0 %    Lymphocyte Rel % 26.4 20.0 - 45.0 %    Monocyte Rel % 8.2 (H) 3.0 - 8.0 %    Eosinophil Rel % 11.6 (H) 0.0 - 4.0 %    Basophil Rel % 0.5 0.0 - 2.0 %     Neutrophils Absolute 3.35 1.50 - 8.30 10*3/mm3    Lymphocytes Absolute 1.67 0.60 - 4.80 10*3/mm3    Monocytes Absolute 0.52 0.00 - 1.00 10*3/mm3    Eosinophils Absolute 0.73 (H) 0.10 - 0.30 10*3/mm3    Basophils Absolute 0.03 0.00 - 0.20 10*3/mm3    Immature Granulocyte Rel % 0.3 0.0 - 0.5 %    Immature Grans Absolute 0.02 0.00 - 0.03 10*3/mm3    nRBC 0.0 0.0 - 0.0 /100 WBC           Sandra was seen today for depression and wound infection.    Diagnoses and all orders for this visit:    Cellulitis of great toe, right  -     sulfamethoxazole-trimethoprim (BACTRIM DS,SEPTRA DS) 800-160 MG per tablet; Take 1 tablet by mouth 2 (Two) Times a Day.    Depression with anxiety  -     FLUoxetine (PROzac) 20 MG tablet; Take 1 tablet by mouth Daily for 90 days.    1. Cellulitis of great toe with small wound.  Start Bactrim DS.  Wound care instructions given.  Anticipatory guidance given.  Warning s/sxs discussed.    2. Depression with anxiety.  Start fluoxetine 20 mg daily.  F/U next month as scheduled.  Anticipatory guidance given.      Outpatient Medications Prior to Visit   Medication Sig Dispense Refill   • betamethasone dipropionate (DIPROLENE) 0.05 % cream Apply  topically to the appropriate area as directed 2 (Two) Times a Day. Apply to affected area BID 30 g 0   • clonazePAM (KlonoPIN) 0.5 MG tablet TAKE TWO TABLETS BY MOUTH EVERY NIGHT AT BEDTIME AS NEEDED 60 tablet 2   • fluticasone (FLONASE) 50 MCG/ACT nasal spray 2 sprays into the nostril(s) as directed by provider Daily.     • furosemide (LASIX) 20 MG tablet Take 1 tablet by mouth Daily. 30 tablet 3   • gabapentin (NEURONTIN) 400 MG capsule Take 400 mg by mouth every 4 (four) hours.     • memantine (NAMENDA) 5 MG tablet TAKE ONE TABLET BY MOUTH DAILY FOR 7 DAYS, THEN INCREASE TO TAKE TWO TABLETS BY MOUTH DAILY 180 tablet 1   • Morphine (MS CONTIN) 60 MG 12 hr tablet Take 100 mg by mouth 2 (Two) Times a Day.     • Morphine (MSIR) 15 MG tablet Take 15 mg by mouth 2  (Two) Times a Day.     • omeprazole (priLOSEC) 20 MG capsule Take 20 mg by mouth.     • buPROPion XL (WELLBUTRIN XL) 300 MG 24 hr tablet TAKE ONE TABLET BY MOUTH EVERY MORNING 30 tablet 5   • citalopram (CeleXA) 40 MG tablet TAKE ONE TABLET BY MOUTH DAILY 30 tablet 5     No facility-administered medications prior to visit.      New Medications Ordered This Visit   Medications   • sulfamethoxazole-trimethoprim (BACTRIM DS,SEPTRA DS) 800-160 MG per tablet     Sig: Take 1 tablet by mouth 2 (Two) Times a Day.     Dispense:  20 tablet     Refill:  0   • FLUoxetine (PROzac) 20 MG tablet     Sig: Take 1 tablet by mouth Daily for 90 days.     Dispense:  30 tablet     Refill:  2     [unfilled]  Medications Discontinued During This Encounter   Medication Reason   • buPROPion XL (WELLBUTRIN XL) 300 MG 24 hr tablet *Therapy completed   • citalopram (CeleXA) 40 MG tablet *Therapy completed   • buPROPion XL (WELLBUTRIN XL) 300 MG 24 hr tablet *Therapy completed         Return if symptoms worsen or fail to improve, for Next scheduled follow up.   Additional Complaints
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
## 2019-02-04 ENCOUNTER — EMERGENCY (EMERGENCY)
Facility: HOSPITAL | Age: 39
LOS: 0 days | Discharge: HOME | End: 2019-02-05
Attending: EMERGENCY MEDICINE | Admitting: EMERGENCY MEDICINE

## 2019-02-04 VITALS
DIASTOLIC BLOOD PRESSURE: 91 MMHG | SYSTOLIC BLOOD PRESSURE: 130 MMHG | TEMPERATURE: 98 F | HEART RATE: 88 BPM | OXYGEN SATURATION: 99 % | RESPIRATION RATE: 17 BRPM | WEIGHT: 190.04 LBS

## 2019-02-04 DIAGNOSIS — O20.0 THREATENED ABORTION: ICD-10-CM

## 2019-02-04 DIAGNOSIS — Z3A.01 LESS THAN 8 WEEKS GESTATION OF PREGNANCY: ICD-10-CM

## 2019-02-04 DIAGNOSIS — O20.9 HEMORRHAGE IN EARLY PREGNANCY, UNSPECIFIED: ICD-10-CM

## 2019-02-04 DIAGNOSIS — Z79.1 LONG TERM (CURRENT) USE OF NON-STEROIDAL ANTI-INFLAMMATORIES (NSAID): ICD-10-CM

## 2019-02-04 NOTE — ED ADULT TRIAGE NOTE - CHIEF COMPLAINT QUOTE
"I think im having a miscarriage"pt  pt states she is pregnant but does not know exactly how far along she is and is now having vaginal spotting and "slight cramping"

## 2019-02-05 VITALS
OXYGEN SATURATION: 98 % | HEART RATE: 78 BPM | SYSTOLIC BLOOD PRESSURE: 123 MMHG | RESPIRATION RATE: 18 BRPM | DIASTOLIC BLOOD PRESSURE: 76 MMHG | TEMPERATURE: 98 F

## 2019-02-05 VITALS
SYSTOLIC BLOOD PRESSURE: 145 MMHG | TEMPERATURE: 98 F | DIASTOLIC BLOOD PRESSURE: 80 MMHG | HEART RATE: 103 BPM | RESPIRATION RATE: 18 BRPM

## 2019-02-05 VITALS
HEART RATE: 84 BPM | TEMPERATURE: 97 F | DIASTOLIC BLOOD PRESSURE: 90 MMHG | OXYGEN SATURATION: 98 % | RESPIRATION RATE: 19 BRPM | SYSTOLIC BLOOD PRESSURE: 136 MMHG

## 2019-02-05 DIAGNOSIS — T59.3X1A: ICD-10-CM

## 2019-02-05 DIAGNOSIS — Z3A.01 LESS THAN 8 WEEKS GESTATION OF PREGNANCY: ICD-10-CM

## 2019-02-05 DIAGNOSIS — O9A.211 INJURY, POISONING AND CERTAIN OTHER CONSEQUENCES OF EXTERNAL CAUSES COMPLICATING PREGNANCY, FIRST TRIMESTER: ICD-10-CM

## 2019-02-05 LAB
ALBUMIN SERPL ELPH-MCNC: 4.3 G/DL — SIGNIFICANT CHANGE UP (ref 3.5–5.2)
ALP SERPL-CCNC: 64 U/L — SIGNIFICANT CHANGE UP (ref 30–115)
ALT FLD-CCNC: 20 U/L — SIGNIFICANT CHANGE UP (ref 0–41)
ANION GAP SERPL CALC-SCNC: 17 MMOL/L — HIGH (ref 7–14)
AST SERPL-CCNC: 20 U/L — SIGNIFICANT CHANGE UP (ref 0–41)
BASOPHILS # BLD AUTO: 0.06 K/UL — SIGNIFICANT CHANGE UP (ref 0–0.2)
BASOPHILS NFR BLD AUTO: 0.5 % — SIGNIFICANT CHANGE UP (ref 0–1)
BILIRUB SERPL-MCNC: <0.2 MG/DL — SIGNIFICANT CHANGE UP (ref 0.2–1.2)
BLD GP AB SCN SERPL QL: SIGNIFICANT CHANGE UP
BUN SERPL-MCNC: 13 MG/DL — SIGNIFICANT CHANGE UP (ref 10–20)
CALCIUM SERPL-MCNC: 9.5 MG/DL — SIGNIFICANT CHANGE UP (ref 8.5–10.1)
CHLORIDE SERPL-SCNC: 96 MMOL/L — LOW (ref 98–110)
CO2 SERPL-SCNC: 23 MMOL/L — SIGNIFICANT CHANGE UP (ref 17–32)
CREAT SERPL-MCNC: 0.7 MG/DL — SIGNIFICANT CHANGE UP (ref 0.7–1.5)
EOSINOPHIL # BLD AUTO: 0.15 K/UL — SIGNIFICANT CHANGE UP (ref 0–0.7)
EOSINOPHIL NFR BLD AUTO: 1.2 % — SIGNIFICANT CHANGE UP (ref 0–8)
GLUCOSE SERPL-MCNC: 81 MG/DL — SIGNIFICANT CHANGE UP (ref 70–99)
HCG SERPL-ACNC: 5286 MIU/ML — HIGH
HCT VFR BLD CALC: 39.1 % — SIGNIFICANT CHANGE UP (ref 37–47)
HGB BLD-MCNC: 12.7 G/DL — SIGNIFICANT CHANGE UP (ref 12–16)
IMM GRANULOCYTES NFR BLD AUTO: 0.2 % — SIGNIFICANT CHANGE UP (ref 0.1–0.3)
LYMPHOCYTES # BLD AUTO: 33.7 % — SIGNIFICANT CHANGE UP (ref 20.5–51.1)
LYMPHOCYTES # BLD AUTO: 4.16 K/UL — HIGH (ref 1.2–3.4)
MCHC RBC-ENTMCNC: 26.8 PG — LOW (ref 27–31)
MCHC RBC-ENTMCNC: 32.5 G/DL — SIGNIFICANT CHANGE UP (ref 32–37)
MCV RBC AUTO: 82.7 FL — SIGNIFICANT CHANGE UP (ref 81–99)
MONOCYTES # BLD AUTO: 1.16 K/UL — HIGH (ref 0.1–0.6)
MONOCYTES NFR BLD AUTO: 9.4 % — HIGH (ref 1.7–9.3)
NEUTROPHILS # BLD AUTO: 6.8 K/UL — HIGH (ref 1.4–6.5)
NEUTROPHILS NFR BLD AUTO: 55 % — SIGNIFICANT CHANGE UP (ref 42.2–75.2)
NRBC # BLD: 0 /100 WBCS — SIGNIFICANT CHANGE UP (ref 0–0)
PLATELET # BLD AUTO: 348 K/UL — SIGNIFICANT CHANGE UP (ref 130–400)
POTASSIUM SERPL-MCNC: 4.2 MMOL/L — SIGNIFICANT CHANGE UP (ref 3.5–5)
POTASSIUM SERPL-SCNC: 4.2 MMOL/L — SIGNIFICANT CHANGE UP (ref 3.5–5)
PROT SERPL-MCNC: 7.4 G/DL — SIGNIFICANT CHANGE UP (ref 6–8)
RBC # BLD: 4.73 M/UL — SIGNIFICANT CHANGE UP (ref 4.2–5.4)
RBC # FLD: 13.5 % — SIGNIFICANT CHANGE UP (ref 11.5–14.5)
SODIUM SERPL-SCNC: 136 MMOL/L — SIGNIFICANT CHANGE UP (ref 135–146)
TYPE + AB SCN PNL BLD: SIGNIFICANT CHANGE UP
WBC # BLD: 12.36 K/UL — HIGH (ref 4.8–10.8)
WBC # FLD AUTO: 12.36 K/UL — HIGH (ref 4.8–10.8)

## 2019-02-05 RX ORDER — SODIUM CHLORIDE 9 MG/ML
1000 INJECTION INTRAMUSCULAR; INTRAVENOUS; SUBCUTANEOUS ONCE
Qty: 0 | Refills: 0 | Status: COMPLETED | OUTPATIENT
Start: 2019-02-05 | End: 2019-02-05

## 2019-02-05 RX ADMIN — SODIUM CHLORIDE 2000 MILLILITER(S): 9 INJECTION INTRAMUSCULAR; INTRAVENOUS; SUBCUTANEOUS at 02:51

## 2019-02-05 NOTE — ED PROVIDER NOTE - NSFOLLOWUPINSTRUCTIONS_ED_ALL_ED_FT
Threatened Miscarriage    A threatened miscarriage is the term for vaginal bleeding during your first 20 weeks of pregnancy but the pregnancy has not ended. If you have vaginal bleeding during this time, your health care provider will do tests to check if you are still pregnant. If the tests show you are still pregnant and the developing baby (fetus) inside your womb (uterus) is still growing, your condition is considered a threatened miscarriage. A threatened miscarriage does not mean your pregnancy will end, but it does increase the risk of losing your pregnancy. It is important to have close follow up with your obstetrician.    SEEK IMMEDIATE MEDICAL CARE IF YOU HAVE ANY OF THE FOLLOWING SYMPTOMS: heavy vaginal bleeding, severe low back or abdominal cramps, fever/chills, or lightheadedness/dizziness.    Follow up with your OB/GYN within 48 hours for repeat hCG and ultrasound.

## 2019-02-05 NOTE — ED PROVIDER NOTE - NSFOLLOWUPCLINICS_GEN_ALL_ED_FT
[facility]  Ophthalmolgy  [address], [address]  [place] [address]  Phone: [phone]  Fax:   Follow Up Time: 1-3 Days

## 2019-02-05 NOTE — ED ADULT NURSE NOTE - NSIMPLEMENTINTERV_GEN_ALL_ED
Implemented All Universal Safety Interventions:  [place] to call system. Call bell, personal items and telephone within reach. Instruct patient to call for assistance. Room bathroom lighting operational. Non-slip footwear when patient is off stretcher. Physically safe environment: no spills, clutter or unnecessary equipment. Stretcher in lowest position, wheels locked, appropriate side rails in place.
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
## 2019-02-05 NOTE — ED PROVIDER NOTE - MEDICAL DECISION MAKING DETAILS
38yoF with  at approx 5 wks' gestation presents with vaginal spotting tonight. Denies abdominal pain, fever, vomiting, or any other symptoms. Was here recently with similar symptoms, left AMA prior to U/S results. On exam, afebrile, hemodynamically stable, saturating well, NAD, well appearing, head NCAT, EOMI grossly, anicteric, MMM, breathing comfortably on RA, abd soft, NT, ND, nml BS, no rebound or guarding, AAO, CN's 3-12 grossly intact, VALENTIN spontaneously, no leg cyanosis or edema, skin warm, well perfused, no rashes or hives, no active bleeding on resident exam and declines further exam. Abdomen benign and no pain/tenderness with low suspicion for appe. No s/s's of UTI or pyelo. Rh+. Ultrasound with likely IUP, however some degree of uncertainty. Pt well appearing, hemodynamically stable. Discharged with need for close OB f/u, need for repeat B in 2 days, further return precautions.

## 2019-02-05 NOTE — ED PROVIDER NOTE - ATTENDING CONTRIBUTION TO CARE
38yoF with  at approx 5 wks' gestation presents with vaginal spotting tonight. Denies abdominal pain, fever, vomiting, or any other symptoms. Was here recently with similar symptoms, left AMA prior to U/S results. On exam, afebrile, hemodynamically stable, saturating well, NAD, well appearing, head NCAT, EOMI grossly, anicteric, MMM, breathing comfortably on RA, abd soft, NT, ND, nml BS, no rebound or guarding, AAO, CN's 3-12 grossly intact, VALENTIN spontaneously, no leg cyanosis or edema, skin warm, well perfused, no rashes or hives, no active bleeding on resident exam and declines further exam. Abdomen benign and no pain/tenderness with low suspicion for appe. No s/s's of UTI or pyelo. Rh+ 38yoF with  at approx 5 wks' gestation presents with vaginal spotting tonight. Denies abdominal pain, fever, vomiting, or any other symptoms. Was here recently with similar symptoms, left AMA prior to U/S results. On exam, afebrile, hemodynamically stable, saturating well, NAD, well appearing, head NCAT, EOMI grossly, anicteric, MMM, breathing comfortably on RA, abd soft, NT, ND, nml BS, no rebound or guarding, AAO, CN's 3-12 grossly intact, VALENTIN spontaneously, no leg cyanosis or edema, skin warm, well perfused, no rashes or hives, no active bleeding on resident exam and declines further exam. Abdomen benign and no pain/tenderness with low suspicion for appe. No s/s's of UTI or pyelo. Rh+. Ultrasound with likely IUP, however some degree of uncertainty. Pt well appearing, hemodynamically stable. Discharged with need for close OB f/u, need for repeat B in 2 days, further return precautions.

## 2019-02-05 NOTE — ED ADULT NURSE NOTE - OBJECTIVE STATEMENT
Patient reports this is her 10th pregnancy, her LMP was dec 27 and today she began to spot red blood. Denies any cramping.

## 2019-02-05 NOTE — ED PROVIDER NOTE - NSFOLLOWUPCLINICS_GEN_ALL_ED_FT
[facility]  OB/GYN  [address] [place] [address]  Phone: [phone]  Fax:   Follow Up Time:

## 2019-02-05 NOTE — ED PROVIDER NOTE - PHYSICAL EXAMINATION
CONSTITUTIONAL: well developed, nontoxic appearing, in no acute distress, speaking in full sentences  SKIN: warm, dry, no rash, cap refill < 2 seconds  HEENT: normocephalic, atraumatic, no conjunctival erythema, moist mucous membranes, patent airway  NECK: supple, no masses  CV:  regular rate, regular rhythm, 2+ radial  pulses bilaterally  RESP: no wheezes, no rales, no rhonchi, normal work of breathing  ABD: soft, nontender, nondistended, no rebound, no guarding, normoactive bowel sounds  BACK: no CVA tenderness  : normal external genitalia without lesions or rash, no blood or discharge in vaginal vault, pt refused further speculum exam, unable to visualize cervical os female chaperone RN Trina Rios present  MSK: normal ROM, no cyanosis, no edema  NEURO: alert, oriented, grossly unremarkable  PSYCH: cooperative, appropriate

## 2019-02-05 NOTE — ED PROVIDER NOTE - OBJECTIVE STATEMENT
39 yo F  5w3d by LMP (18) who presents with vaginal spotting since today. No abd cramping, f, ch, n, v, d, dysuria. Pt was seen in the ED 1 wk ago for abd pain, at that time hCG was 80 and pt left AMA prior to US results which showed no IUP. She did not f/u with her OBGYN since then. Has had 3 miscarriages, 1 medical , and 1 D&C in past. No abd surgeries.

## 2019-02-05 NOTE — ED PROVIDER NOTE - PHYSICAL EXAMINATION
Constitutional: Well developed, well nourished. NAD.  Head: Normocephalic, atraumatic.  Eyes: PERRL. EOMI. Conjunctiva injected. Vision 20/20 bilaterally.  ENT: No nasal discharge. Mucous membranes moist.  Neck: Supple. Painless ROM.  Cardiovascular: Normal S1, S2. Regular rate and rhythm. No murmurs, rubs, or gallops.  Pulmonary: Normal respiratory rate and effort. Lungs clear to auscultation bilaterally. No wheezing, rales, or rhonchi.  Abdominal: Soft. Nondistended. Nontender. No rebound, guarding, rigidity.  Extremities. Pelvis stable. No lower extremity edema, symmetric calves.  Skin: + erythema over right chest, right arm.  Neuro: AAOx3. No focal neurological deficits.  Psych: Normal mood. Normal affect.

## 2019-02-05 NOTE — ED PROVIDER NOTE - PROGRESS NOTE DETAILS
39 yo F  5w3d by LMP (18) presenting with painless vaginal bleeding. Hemodynamically stable here. Was unable to visualize cervical os because pt refused speculum exam despite explaining need to r/o septic missed . DDx includes ectopic, complete , missed , threatened . Ordered labs, TVUS. hCG well above discriminatory zone. Early IUP seen on US without fetal pole activity. Informed pt and instructed to f/u with OBGYN within 48 hrs to repeat hCG and US. Pt stated that she would f/u with her OBGYN tomorrow, also given info for OB clinic here. Given printed report of lab and US results. Strict ED return precautions given. Pt verbalized understanding and was agreeable with plan.

## 2019-02-05 NOTE — ED PROVIDER NOTE - PROGRESS NOTE DETAILS
Pt irrigated her eyes in ED with resolution of symptoms. Pt washed affected skin with soap and water. will d/c.
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
## 2019-02-14 ENCOUNTER — EMERGENCY (EMERGENCY)
Facility: HOSPITAL | Age: 39
LOS: 0 days | Discharge: HOME | End: 2019-02-15
Attending: EMERGENCY MEDICINE | Admitting: EMERGENCY MEDICINE

## 2019-02-14 VITALS
OXYGEN SATURATION: 100 % | HEART RATE: 86 BPM | DIASTOLIC BLOOD PRESSURE: 65 MMHG | RESPIRATION RATE: 18 BRPM | SYSTOLIC BLOOD PRESSURE: 136 MMHG | TEMPERATURE: 99 F
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
## 2019-02-14 DIAGNOSIS — Z3A.01 LESS THAN 8 WEEKS GESTATION OF PREGNANCY: ICD-10-CM
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
## 2019-02-14 DIAGNOSIS — O9A.211 INJURY, POISONING AND CERTAIN OTHER CONSEQUENCES OF EXTERNAL CAUSES COMPLICATING PREGNANCY, FIRST TRIMESTER: ICD-10-CM

## 2019-02-14 DIAGNOSIS — Z79.1 LONG TERM (CURRENT) USE OF NON-STEROIDAL ANTI-INFLAMMATORIES (NSAID): ICD-10-CM

## 2019-02-14 DIAGNOSIS — S30.0XXA CONTUSION OF LOWER BACK AND PELVIS, INITIAL ENCOUNTER: ICD-10-CM

## 2019-02-14 NOTE — ED PROVIDER NOTE - PROGRESS NOTE DETAILS
pt in nad, pain free. no bleeding. US results dw pt. will haved her f/u with obgyn. bleeding precautions dw. US: , CRL 6w4d.

## 2019-02-14 NOTE — ED PROVIDER NOTE - CLINICAL SUMMARY MEDICAL DECISION MAKING FREE TEXT BOX
Buttocks contusion s/p fall from standing. Early pregnancy. Vaginal spotting (has been occurring during preg). pt blood type A+. no ab or pelvic pain. pt had US to check fetus. dc to f/u with her OB.

## 2019-02-14 NOTE — ED PROVIDER NOTE - CARE PLAN
Principal Discharge DX:	Contusion of buttock, initial encounter  Secondary Diagnosis:	First trimester pregnancy

## 2019-02-14 NOTE — ED PROVIDER NOTE - OBJECTIVE STATEMENT
37 yo preg female, LMP 18,  (2 top, 4 kids, 3 spont ab), sts slipped and fell onto her buttocks/low back. no loc, no other injury. no ab pain, no pelvbic cramps. mild spotting. had spotting earlier in preg. saw her gyn recently, had blood work and US showing fetal pole. sts she is A+ blood type. no urinary sx. no fever or chills.

## 2019-02-14 NOTE — ED PROVIDER NOTE - PHYSICAL EXAMINATION
VITAL SIGNS: I have reviewed nursing notes and confirm.  CONSTITUTIONAL: Well-developed; well-nourished; in no acute distress.  SKIN: Skin exam is warm and dry, no acute rash.  HEAD: Normocephalic; atraumatic.  EYES: PERRL, EOM intact; conjunctiva and sclera clear.  ENT: No nasal discharge; airway clear.   NECK: Supple; non tender.  CARD:+ S1, S2   RESP: No wheezes, rales or rhonchi.  ABD: Normal bowel sounds; soft; non-distended; non-tender;  : ext  nml. refusing internal exam.   EXT: Normal ROM. No cyanosis or edema.  LYMPH: No acute adenopathy.  NEURO: Alert. Grossly unremarkable. No focal deficits.  PSYCH: Cooperative, appropriate.

## 2019-02-14 NOTE — ED PROVIDER NOTE - NSFOLLOWUPINSTRUCTIONS_ED_ALL_ED_FT
Call your OBGYN doctor this morning for an appointment to be re-evaluated.         Preventing  Birth   birth is when your baby is delivered between 20 weeks and 37 weeks of pregnancy. A full-term pregnancy lasts for at least 37 weeks.  birth can be dangerous for your baby because the last few weeks of pregnancy are an important time for your baby's brain and lungs to grow. Many things can cause a baby to be born early. Sometimes the cause is not known. There are certain factors that make you more likely to experience  birth, such as:    Having a previous baby born .  Being pregnant with twins or other multiples.  Having had fertility treatment.  Being overweight or underweight at the start of your pregnancy.  Having any of the following during pregnancy:    An infection, including a urinary tract infection (UTI) or an STI (sexually transmitted infection).  High blood pressure.  Diabetes.  Vaginal bleeding.    Being age 35 or older.  Being age 18 or younger.  Getting pregnant within 6 months of a previous pregnancy.  Suffering extreme stress or physical or emotional abuse during pregnancy.  Standing for long periods of time during pregnancy, such as working at a job that requires standing.    What are the risks?  The most serious risk of  birth is that the baby may not survive. This is more likely to happen if a baby is born before 34 weeks. Other risks and complications of  birth may include your baby having:    Breathing problems.  Brain damage that affects movement and coordination (cerebral palsy).  Feeding difficulties.  Vision or hearing problems.  Infections or inflammation of the digestive tract (colitis).  Developmental delays.  Learning disabilities.  Higher risk for diabetes, heart disease, and high blood pressure later in life.    What can I do to lower my risk?  Medical care     Image   The most important thing you can do to lower your risk for  birth is to get routine medical care during pregnancy (prenatal care). If you have a high risk of  birth, you may be referred to a health care provider who specializes in managing high-risk pregnancies (perinatologist). You may be given medicine to help prevent  birth.    Lifestyle changes     Certain lifestyle changes can also lower your risk of  birth:    Wait at least 6 months after a pregnancy to become pregnant again.  Try to plan pregnancy for when you are between 19 and 35 years old.  Get to a healthy weight before getting pregnant. If you are overweight, work with your health care provider to safely lose weight.  Do not use any products that contain nicotine or tobacco, such as cigarettes and e-cigarettes. If you need help quitting, ask your health care provider.  Do not drink alcohol.  Do not use drugs.    Where to find support  For more support, consider:    Talking with your health care provider.  Talking with a therapist or substance abuse counselor, if you need help quitting.  Working with a diet and nutrition specialist (dietitian) or a  to maintain a healthy weight.  Joining a support group.    Where to find more information  Learn more about preventing  birth from:    Centers for Disease Control and Prevention: cdc.gov/reproductivehealth/maternalinfanthealth/pretermbirth.htm   Dimes: marchofdimes.org/complications/premature-babies.aspx  American Pregnancy Association: americanpregnancy.org/labor-and-birth/premature-labor    Contact a health care provider if:  You have any of the following signs of  labor before 37 weeks:    A change or increase in vaginal discharge.  Fluid leaking from your vagina.  Pressure or cramps in your lower abdomen.  A backache that does not go away or gets worse.  Regular tightening (contractions) in your lower abdomen.    Summary   birth means having your baby during weeks 20–37 of pregnancy.   birth may put your baby at risk for physical and mental problems.  Getting good prenatal care can help prevent  birth.  You can lower your risk of  birth by making certain lifestyle changes, such as not smoking and not using alcohol.  This information is not intended to replace advice given to you by your health care provider. Make sure you discuss any questions you have with your health care provider.

## 2019-02-15 NOTE — ED ADULT NURSE NOTE - OBJECTIVE STATEMENT
Patient fell on her back. States she is 6 weeks pregnants. No vaginal bleeding. History of miscarriage in the past. stats rh positive.

## 2019-05-26 ENCOUNTER — OUTPATIENT (OUTPATIENT)
Dept: OUTPATIENT SERVICES | Facility: HOSPITAL | Age: 39
LOS: 1 days | Discharge: HOME | End: 2019-05-26

## 2019-05-26 ENCOUNTER — EMERGENCY (EMERGENCY)
Facility: HOSPITAL | Age: 39
LOS: 0 days | Discharge: HOME | End: 2019-05-26

## 2019-05-26 VITALS — SYSTOLIC BLOOD PRESSURE: 127 MMHG | HEART RATE: 88 BPM | DIASTOLIC BLOOD PRESSURE: 60 MMHG

## 2019-05-26 VITALS — DIASTOLIC BLOOD PRESSURE: 77 MMHG | HEART RATE: 86 BPM | SYSTOLIC BLOOD PRESSURE: 137 MMHG

## 2019-05-26 DIAGNOSIS — S86.899A OTHER INJURY OF OTHER MUSCLE(S) AND TENDON(S) AT LOWER LEG LEVEL, UNSPECIFIED LEG, INITIAL ENCOUNTER: Chronic | ICD-10-CM

## 2019-05-26 DIAGNOSIS — Z02.9 ENCOUNTER FOR ADMINISTRATIVE EXAMINATIONS, UNSPECIFIED: ICD-10-CM

## 2019-05-26 NOTE — OB PROVIDER TRIAGE NOTE - NSHPPHYSICALEXAM_GEN_ALL_CORE
Vital Signs Last 24 Hrs  T(C): 37 (26 May 2019 05:29), Max: 37 (26 May 2019 05:29)  T(F): 98.6 (26 May 2019 05:29), Max: 98.6 (26 May 2019 05:29)  HR: 86 (26 May 2019 05:29) (86 - 86)  BP: 127/60 (26 May 2019 07:12) (127/60 - 137/77)  RR: 20 (26 May 2019 05:29) (20 - 20)     Udip: neg     EFM: deferred   [place]: no ctx  Abd: soft, gravid, nontender, no palpable ctx  Speculum: no active bleeding, cervix appears normal   SVE: C/L/P, vtx by sono, intact

## 2019-05-26 NOTE — OB PROVIDER TRIAGE NOTE - HISTORY OF PRESENT ILLNESS
37yo  at 21w GA, by LMP c/w 1st trim sono, c/o low back pain since yesterday associated with abdominal cramping, irregular, increased with positional changes, moderately relieved with rest. Pt admits to drinking less water yesterday and doing more housework than usual. Denies ctx, VB/LOF. Good FM. Denies fever, chills, nausea/vomiting, diarrhea/constipation, dysuria, urgency, frequency, vaginal discharge. Last intercourse 1 day ago. Last BM yesterday. Last ate last night. H/o  delivery at 27w GA, on IM progesterone this pregnancy, 1st dose 4 days ago. On ASA 81mg daily for pre-eclampsia prophylaxis, but denies h/o preeclampsia/gHTN in previous pregnancies. Denies other complications during this pregnancy. Anatomy scan scheduled for .

## 2019-05-26 NOTE — OB PROVIDER TRIAGE NOTE - NSOBPROVIDERNOTE_OBGYN_ALL_OB_FT
37yo  at 21w GA, GBS not done, with h/o  labor on IM progesterone, AMA, with reassuring maternal and fetal status, not in  labor, with likely round ligament pain  - d/c home   - tylenol PRN, hot showers, encourage ambulation/PO hydration  - f/u next scheduled appt     Dr. Munroe and Dr. Milan aware.

## 2019-05-29 DIAGNOSIS — O26.892 OTHER SPECIFIED PREGNANCY RELATED CONDITIONS, SECOND TRIMESTER: ICD-10-CM

## 2019-07-26 ENCOUNTER — OUTPATIENT (OUTPATIENT)
Dept: OUTPATIENT SERVICES | Facility: HOSPITAL | Age: 39
LOS: 1 days | Discharge: HOME | End: 2019-07-26
Payer: MEDICAID

## 2019-07-26 VITALS — HEART RATE: 80 BPM | SYSTOLIC BLOOD PRESSURE: 114 MMHG | DIASTOLIC BLOOD PRESSURE: 56 MMHG

## 2019-07-26 VITALS
RESPIRATION RATE: 18 BRPM | HEART RATE: 96 BPM | TEMPERATURE: 98 F | DIASTOLIC BLOOD PRESSURE: 70 MMHG | SYSTOLIC BLOOD PRESSURE: 143 MMHG

## 2019-07-26 DIAGNOSIS — S86.899A OTHER INJURY OF OTHER MUSCLE(S) AND TENDON(S) AT LOWER LEG LEVEL, UNSPECIFIED LEG, INITIAL ENCOUNTER: Chronic | ICD-10-CM

## 2019-07-26 LAB
ALBUMIN SERPL ELPH-MCNC: 3.3 G/DL — LOW (ref 3.5–5.2)
ALP SERPL-CCNC: 97 U/L — SIGNIFICANT CHANGE UP (ref 30–115)
ALT FLD-CCNC: 27 U/L — SIGNIFICANT CHANGE UP (ref 0–41)
AMPHET UR-MCNC: NEGATIVE — SIGNIFICANT CHANGE UP
ANION GAP SERPL CALC-SCNC: 13 MMOL/L — SIGNIFICANT CHANGE UP (ref 7–14)
APPEARANCE UR: ABNORMAL
AST SERPL-CCNC: 30 U/L — SIGNIFICANT CHANGE UP (ref 0–41)
BARBITURATES UR SCN-MCNC: NEGATIVE — SIGNIFICANT CHANGE UP
BASOPHILS # BLD AUTO: 0.06 K/UL — SIGNIFICANT CHANGE UP (ref 0–0.2)
BASOPHILS NFR BLD AUTO: 0.5 % — SIGNIFICANT CHANGE UP (ref 0–1)
BENZODIAZ UR-MCNC: NEGATIVE — SIGNIFICANT CHANGE UP
BILIRUB SERPL-MCNC: <0.2 MG/DL — SIGNIFICANT CHANGE UP (ref 0.2–1.2)
BILIRUB UR-MCNC: NEGATIVE — SIGNIFICANT CHANGE UP
BUN SERPL-MCNC: 6 MG/DL — LOW (ref 10–20)
BUPRENORPHINE SCREEN, URINE RESULT: NEGATIVE — SIGNIFICANT CHANGE UP
CALCIUM SERPL-MCNC: 9.4 MG/DL — SIGNIFICANT CHANGE UP (ref 8.5–10.1)
CHLORIDE SERPL-SCNC: 103 MMOL/L — SIGNIFICANT CHANGE UP (ref 98–110)
CO2 SERPL-SCNC: 22 MMOL/L — SIGNIFICANT CHANGE UP (ref 17–32)
COCAINE METAB.OTHER UR-MCNC: NEGATIVE — SIGNIFICANT CHANGE UP
COLOR SPEC: YELLOW — SIGNIFICANT CHANGE UP
CREAT ?TM UR-MCNC: 53 MG/DL — SIGNIFICANT CHANGE UP
CREAT SERPL-MCNC: 0.6 MG/DL — LOW (ref 0.7–1.5)
DIFF PNL FLD: NEGATIVE — SIGNIFICANT CHANGE UP
EOSINOPHIL # BLD AUTO: 0.31 K/UL — SIGNIFICANT CHANGE UP (ref 0–0.7)
EOSINOPHIL NFR BLD AUTO: 2.4 % — SIGNIFICANT CHANGE UP (ref 0–8)
EPI CELLS # UR: ABNORMAL /HPF
GLUCOSE SERPL-MCNC: 88 MG/DL — SIGNIFICANT CHANGE UP (ref 70–99)
GLUCOSE UR QL: NEGATIVE MG/DL — SIGNIFICANT CHANGE UP
HCT VFR BLD CALC: 35.8 % — LOW (ref 37–47)
HGB BLD-MCNC: 12 G/DL — SIGNIFICANT CHANGE UP (ref 12–16)
IMM GRANULOCYTES NFR BLD AUTO: 0.4 % — HIGH (ref 0.1–0.3)
KETONES UR-MCNC: NEGATIVE — SIGNIFICANT CHANGE UP
L&D DRUG SCREEN, URINE: SIGNIFICANT CHANGE UP
LDH SERPL L TO P-CCNC: 268 — HIGH (ref 50–242)
LEUKOCYTE ESTERASE UR-ACNC: NEGATIVE — SIGNIFICANT CHANGE UP
LYMPHOCYTES # BLD AUTO: 19.9 % — LOW (ref 20.5–51.1)
LYMPHOCYTES # BLD AUTO: 2.53 K/UL — SIGNIFICANT CHANGE UP (ref 1.2–3.4)
MCHC RBC-ENTMCNC: 28.2 PG — SIGNIFICANT CHANGE UP (ref 27–31)
MCHC RBC-ENTMCNC: 33.5 G/DL — SIGNIFICANT CHANGE UP (ref 32–37)
MCV RBC AUTO: 84 FL — SIGNIFICANT CHANGE UP (ref 81–99)
METHADONE UR-MCNC: NEGATIVE — SIGNIFICANT CHANGE UP
MONOCYTES # BLD AUTO: 1.14 K/UL — HIGH (ref 0.1–0.6)
MONOCYTES NFR BLD AUTO: 8.9 % — SIGNIFICANT CHANGE UP (ref 1.7–9.3)
NEUTROPHILS # BLD AUTO: 8.65 K/UL — HIGH (ref 1.4–6.5)
NEUTROPHILS NFR BLD AUTO: 67.9 % — SIGNIFICANT CHANGE UP (ref 42.2–75.2)
NITRITE UR-MCNC: NEGATIVE — SIGNIFICANT CHANGE UP
NRBC # BLD: 0 /100 WBCS — SIGNIFICANT CHANGE UP (ref 0–0)
OPIATES UR-MCNC: NEGATIVE — SIGNIFICANT CHANGE UP
OXYCODONE UR-MCNC: NEGATIVE — SIGNIFICANT CHANGE UP
PCP UR-MCNC: NEGATIVE — SIGNIFICANT CHANGE UP
PH UR: 7 — SIGNIFICANT CHANGE UP (ref 5–8)
PLATELET # BLD AUTO: 288 K/UL — SIGNIFICANT CHANGE UP (ref 130–400)
POTASSIUM SERPL-MCNC: 4 MMOL/L — SIGNIFICANT CHANGE UP (ref 3.5–5)
POTASSIUM SERPL-SCNC: 4 MMOL/L — SIGNIFICANT CHANGE UP (ref 3.5–5)
PROPOXYPHENE QUALITATIVE URINE RESULT: NEGATIVE — SIGNIFICANT CHANGE UP
PROT ?TM UR-MCNC: 8 MG/DLG/24H — SIGNIFICANT CHANGE UP
PROT SERPL-MCNC: 6.7 G/DL — SIGNIFICANT CHANGE UP (ref 6–8)
PROT UR-MCNC: NEGATIVE MG/DL — SIGNIFICANT CHANGE UP
PROT/CREAT UR-RTO: 0.2 RATIO — SIGNIFICANT CHANGE UP (ref 0–0.2)
RBC # BLD: 4.26 M/UL — SIGNIFICANT CHANGE UP (ref 4.2–5.4)
RBC # FLD: 14 % — SIGNIFICANT CHANGE UP (ref 11.5–14.5)
SODIUM SERPL-SCNC: 138 MMOL/L — SIGNIFICANT CHANGE UP (ref 135–146)
SP GR SPEC: 1.01 — SIGNIFICANT CHANGE UP (ref 1.01–1.03)
URATE SERPL-MCNC: 2.4 MG/DL — LOW (ref 2.5–7)
UROBILINOGEN FLD QL: 0.2 MG/DL — SIGNIFICANT CHANGE UP (ref 0.2–0.2)
WBC # BLD: 12.74 K/UL — HIGH (ref 4.8–10.8)
WBC # FLD AUTO: 12.74 K/UL — HIGH (ref 4.8–10.8)
WBC UR QL: SIGNIFICANT CHANGE UP /HPF

## 2019-07-26 PROCEDURE — 59025 FETAL NON-STRESS TEST: CPT | Mod: 26

## 2019-07-26 PROCEDURE — 76815 OB US LIMITED FETUS(S): CPT | Mod: 26

## 2019-07-26 PROCEDURE — 99213 OFFICE O/P EST LOW 20 MIN: CPT | Mod: 25

## 2019-07-26 RX ORDER — SODIUM CHLORIDE 9 MG/ML
1000 INJECTION, SOLUTION INTRAVENOUS
Refills: 0 | Status: DISCONTINUED | OUTPATIENT
Start: 2019-07-26 | End: 2019-08-12

## 2019-07-26 NOTE — OB PROVIDER TRIAGE NOTE - ADDITIONAL INSTRUCTIONS
Discharge home  Labor Precautions given  Preeclamptic precautions given  Oral hydration encouraged  Tylenol PRN for pain  FKC instructions  F/u with OB at next scheduled appt

## 2019-07-26 NOTE — OB PROVIDER TRIAGE NOTE - HISTORY OF PRESENT ILLNESS
39 yo  at 29w5d GA by LMP presents for 3 weeks of irregular contractions, more intense tonight than before. She feels pain approximately every 15 minutes, cramping in nature, midline lower abdomen. She is currently on IM progesterone weekly and daily aspirin 81mg for history of  delivery, both last taken 725 AM. She denies leakage of fluid or vaginal bleeding. She feels regular fetal movement. She goes to [facility] for obgyn services, states that all bloodwork and sonograms were normal.    She was found to have an elevated BP on arrival. She denies history of hypertensive disorder. She denies headache, vision changes, chest pain, SOB, RUQ pain, or new onset swelling.

## 2019-07-26 NOTE — OB PROVIDER TRIAGE NOTE - NS_OBGYNHISTORY_OBGYN_ALL_OB_FT
Ob: FT  X2, 7lb; G3:   at 27w GA; [id number] ( labor); G4:FT  X1; SABX4, no D&C; etopX2, no D&C   GYN: Denies history of fibroids, cysts, abnormal paps. Chlamydia treated on

## 2019-07-26 NOTE — PROGRESS NOTE ADULT - SUBJECTIVE AND OBJECTIVE BOX
PGY3 progress note    Patient seen and examined at bedside, resting comfortably. Denies headache, blurry vision, chest pain, SOB, epigastric pain and leg swelling. Denies contractions, vaginal bleeding and LOF. Feels good fetal movements.     PE:  Vital Signs Last 24 Hrs  T(C): 36.8 (2019 02:08), Max: 36.8 (2019 02:08)  T(F): 98.2 (2019 02:08), Max: 98.2 (2019 02:08)  HR: 81 (2019 06:02) (75 - 115)  BP: 106/55 (2019 06:02) (106/55 - 143/70)  BP(mean): --  RR: 18 (2019 02:08) (18 - 18)  SpO2: --    EFM: 150/mod/accels+/spontnaeous 2 minute deceleration with return to baseline  toco: occasinoal ctx  SVE: deferred, examined twice @0230 and 0500 and was closed    labs:                        12.0   12.74 )-----------( 288      ( 2019 03:42 )             35.8     07-26    138  |  103  |  6<L>  ----------------------------<  88  4.0   |  22  |  0.6<L>    Ca    9.4      2019 03:42    TPro  6.7  /  Alb  3.3<L>  /  TBili  <0.2  /  DBili  x   /  AST  30  /  ALT  27  /  AlkPhos  97  07-    uric acid 2.4,     Urinalysis Basic - ( 2019 03:42 )    Color: Yellow / Appearance: Cloudy / S.010 / pH: x  Gluc: x / Ketone: Negative  / Bili: Negative / Urobili: 0.2 mg/dL   Blood: x / Protein: Negative mg/dL / Nitrite: Negative   Leuk Esterase: Negative / RBC: x / WBC 1-2 /HPF   Sq Epi: x / Non Sq Epi: Occasional /HPF / Bacteria: x

## 2019-07-26 NOTE — PROGRESS NOTE ADULT - ASSESSMENT
39 y/o  at 29w5d GA, isolated elevated BP, s/p prolonged BP monitoring, did not meet blood pressure criteria, doing well.   - continue with monitoring. If tracing category I for another 20 minutes can come off monitor  - discharge home  - preeclamptic and  labor precautions given  - f/u with PMD at scheduled appointment   - f/u Upr/cr    Dr. Mujica to be made aware. 39 y/o  at 29w5d GA, isolated elevated BP, s/p prolonged BP monitoring, did not meet blood pressure criteria, doing well.   - continue with monitoring. If tracing category I for another 20 minutes can come off monitor  - discharge home  - preeclamptic and  labor precautions given  - f/u with PMD at scheduled appointment   - f/u Upr/cr    Dr. Mujica to be made aware.       ADDENDUM:  Cat 1 tracing, NST reactive. No more elevated BPs.  Will DC home.

## 2019-07-26 NOTE — OB PROVIDER TRIAGE NOTE - NSOBPROVIDERNOTE_OBGYN_ALL_OB_FT
39 yo  at 29w5d GA by LMP, with elevated BP on arrival r/o gHTN, r/o pre-eclampsia, r/o  labor  -Pt offered tylenol for pain, she declined  -Transfer to recovery room  -IV fluids  -Regular diet  -Monitor BPs q15min  -Pre-eclamptic labs  -Will re-examine    Discussed with Dr. Andino and Dr. Daniels 39 yo  at 29w5d GA by LMP, with elevated BP on arrival r/o gHTN, r/o pre-eclampsia, r/o  labor  -Pt offered tylenol for pain, she declined  -Transfer to recovery room  -IV fluids  -Regular diet  -Monitor BPs q15min  -Pre-eclamptic labs  -Will re-examine    Discussed with Dr. Andino and Dr. Daniels    ADDENDUM  Patient re-evaluated. She is resting comfortably. BPs have been within the normal range except for the initial BP. She denies headache, vision changes, chest pain, SOB, RUQ pain, or new onset swelling. SVE unchanged, cervix closed. patient is not in  labor. Will continue to monitor until 4 hours of normal blood pressures. 39 yo  at 29w5d GA by LMP, with elevated BP on arrival r/o gHTN, r/o pre-eclampsia, r/o  labor  -Pt offered tylenol for pain, she declined  -Transfer to recovery room  -IV fluids  -Regular diet  -Monitor BPs q15min  -Pre-eclamptic labs  -Will re-examine    Discussed with Dr. Andino and Dr. Daniels    ADDENDUM  Patient re-evaluated. She is resting comfortably. BPs have been within the normal range except for the initial BP. She denies headache, vision changes, chest pain, SOB, RUQ pain, or new onset swelling. SVE unchanged, cervix closed. patient is not in  labor. Will continue to monitor until 4 hours of normal blood pressures.    Attending: Pt seen and examined with resident and agree with resident note and plan of care  nst reactive  sono mvp >2cm

## 2019-07-26 NOTE — OB PROVIDER TRIAGE NOTE - NSHPPHYSICALEXAM_GEN_ALL_CORE
Vital Signs Last 24 Hrs  T(F): 98.2 (26 Jul 2019 02:08), Max: 98.2 (26 Jul 2019 02:08)  HR: 85 (26 Jul 2019 04:04) (83 - 115)  BP: 128/67 (26 Jul 2019 04:04) (120/65 - 143/70)  RR: 18 (26 Jul 2019 02:08) (18 - 18)    Udip: negative    EFM: 140/mod/accels  [place]: irregular    Gen: AAOx3, NAD  Abd: Gravid, soft, nontender, no palpable contractions, no CVA tenderness, no suprapubic tenderness  Speculum: Normal vagina, no lesions, no discharge, no fluid, no bleeding. Cervix appears closed. No CMT. Vital Signs Last 24 Hrs  T(F): 98.2 (26 Jul 2019 02:08), Max: 98.2 (26 Jul 2019 02:08)  HR: 85 (26 Jul 2019 04:04) (83 - 115)  BP: 128/67 (26 Jul 2019 04:04) (120/65 - 143/70)  RR: 18 (26 Jul 2019 02:08) (18 - 18)    Udip: negative    EFM: 140/mod/accels  [place]: irregular    Denies the following: constitutional symptoms, visual symptoms, cardiovascular symptoms, respiratory symptoms, GI symptoms, musculoskeletal symptoms, skin symptoms, neurologic symptoms, hematologic symptoms, allergic symptoms, or psychiatric symptoms, except any pertinent positives listed.      Gen: AAOx3, NAD  lung:Ctabl  Cario:rrr  Abd: Gravid, soft, nontender, no palpable contractions, no CVA tenderness, no suprapubic tenderness  Speculum: Normal vagina, no lesions, no discharge, no fluid, no bleeding. Cervix appears closed. No CMT.  ext: no edema, neg homans

## 2020-08-07 ENCOUNTER — EMERGENCY (EMERGENCY)
Facility: HOSPITAL | Age: 40
LOS: 0 days | Discharge: HOME | End: 2020-08-08
Attending: EMERGENCY MEDICINE | Admitting: EMERGENCY MEDICINE
Payer: MEDICAID

## 2020-08-07 VITALS
DIASTOLIC BLOOD PRESSURE: 69 MMHG | RESPIRATION RATE: 16 BRPM | SYSTOLIC BLOOD PRESSURE: 154 MMHG | OXYGEN SATURATION: 100 % | TEMPERATURE: 99 F | HEART RATE: 89 BPM

## 2020-08-07 DIAGNOSIS — Z79.899 OTHER LONG TERM (CURRENT) DRUG THERAPY: ICD-10-CM

## 2020-08-07 DIAGNOSIS — N39.0 URINARY TRACT INFECTION, SITE NOT SPECIFIED: ICD-10-CM

## 2020-08-07 DIAGNOSIS — S86.899A OTHER INJURY OF OTHER MUSCLE(S) AND TENDON(S) AT LOWER LEG LEVEL, UNSPECIFIED LEG, INITIAL ENCOUNTER: Chronic | ICD-10-CM

## 2020-08-07 DIAGNOSIS — R35.0 FREQUENCY OF MICTURITION: ICD-10-CM

## 2020-08-07 PROCEDURE — 99283 EMERGENCY DEPT VISIT LOW MDM: CPT

## 2020-08-07 NOTE — ED PROVIDER NOTE - CARE PROVIDER_API CALL
TERESO DESAI  [id number]  [address] [facility] [place] [address]  [place] [address]  Phone: [phone]  Fax: [phone]  Established Patient  Follow Up Time:

## 2020-08-07 NOTE — ED PROVIDER NOTE - OBJECTIVE STATEMENT
39 yold female to Ed  Lmp: 1 week ago c/o urinary frequency, urgency, burning, and lower abdominal pressure x 3 days worse today; pt with similar sx in past with uti; denies fever, chills, n,v, back/flank pain; pt admits to recent unprotected with father of her children x 2 weeks; pt also with hx of sti in past; pt denies vaginal discharge

## 2020-08-07 NOTE — ED PROVIDER NOTE - CLINICAL SUMMARY MEDICAL DECISION MAKING FREE TEXT BOX
pt seen for dysuria, no fevers or chills, + UTI on UA. GC/chlamydia tests sent. pt advised to follow up closely with PMD and agreed. Strict return precautions advised and pt verbalized understanding.

## 2020-08-07 NOTE — ED PROVIDER NOTE - ATTENDING CONTRIBUTION TO CARE
40 yo presented c/o dysuria, frequency and urgency x 1 week associated with suprapubic discomfort. Pt denied any fevers, chills, N/V/D or flank pain. + recently sexually active with partner after some time. No vaginal d/c; hx STI which was treated.     VITAL SIGNS: noted  CONSTITUTIONAL: Well-developed; well-nourished; in no acute distress  HEAD: Normocephalic; atraumatic  EYES: PERRL, EOM intact; conjunctiva and sclera clear  ENT: No nasal discharge; airway clear. MMM  NECK: Supple; non tender.    CARD: S1, S2 normal; no murmurs, gallops, or rubs. Regular rate and rhythm  RESP: CTAB/L, no wheezes, rales or rhonchi  ABD: Normal bowel sounds; soft; non-distended; mild suprapubic tenderness; no hepatosplenomegaly. No CVA tenderness  EXT: Normal ROM. No calf tenderness or edema. Distal pulses intact  NEURO: Alert, oriented. Grossly unremarkable. No focal deficits  SKIN: Skin exam is warm and dry, no acute rash  MS: No midline spinal tenderness

## 2020-08-07 NOTE — ED PROVIDER NOTE - PHYSICAL EXAMINATION
Constitutional: Well developed, well nourished. NAD  Head: Normocephalic, atraumatic.  Eyes: PERRL, EOMI.  ENT: No nasal discharge. Mucous membranes dry.  Neck: Supple. Painless ROM.  Cardiovascular:  Regular rate and rhythm.   Pulmonary: Lungs clear to auscultation bilaterally.  Abdominal: Soft. Nondistended. No rebound, guarding, rigidity. no flank pain  Extremities. Pelvis stable. No lower extremity edema, symmetric calves.  Skin: No rashes, cyanosis.  Neuro: AAOx3. No focal neurological deficits.  Psych: Normal mood. Normal affect.

## 2020-08-07 NOTE — ED PROVIDER NOTE - PATIENT PORTAL LINK FT
You can access the FollowMyHealth Patient Portal offered by [facility] by registering at the following website: [email or web address]. By joining [facility]’s FollowMyHealth portal, you will also be able to view your health information using other applications (apps) compatible with our system.

## 2020-08-08 LAB
APPEARANCE UR: ABNORMAL
BACTERIA # UR AUTO: NEGATIVE — SIGNIFICANT CHANGE UP
BILIRUB UR-MCNC: NEGATIVE — SIGNIFICANT CHANGE UP
COLOR SPEC: ABNORMAL
DIFF PNL FLD: ABNORMAL
EPI CELLS # UR: 0 /HPF — SIGNIFICANT CHANGE UP (ref 0–5)
GLUCOSE UR QL: NEGATIVE — SIGNIFICANT CHANGE UP
HYALINE CASTS # UR AUTO: 7 /LPF — SIGNIFICANT CHANGE UP (ref 0–7)
KETONES UR-MCNC: NEGATIVE — SIGNIFICANT CHANGE UP
LEUKOCYTE ESTERASE UR-ACNC: ABNORMAL
NITRITE UR-MCNC: NEGATIVE — SIGNIFICANT CHANGE UP
PH UR: 6.5 — SIGNIFICANT CHANGE UP (ref 5–8)
PROT UR-MCNC: ABNORMAL
RBC CASTS # UR COMP ASSIST: >720 /HPF — HIGH (ref 0–4)
SP GR SPEC: 1.02 — SIGNIFICANT CHANGE UP (ref 1.01–1.02)
UROBILINOGEN FLD QL: SIGNIFICANT CHANGE UP
WBC UR QL: >720 /HPF — HIGH (ref 0–5)

## 2020-08-08 RX ORDER — PHENAZOPYRIDINE HCL 100 MG
200 TABLET ORAL ONCE
Refills: 0 | Status: COMPLETED | OUTPATIENT
Start: 2020-08-08 | End: 2020-08-08

## 2020-08-08 RX ORDER — CEFPODOXIME PROXETIL 100 MG
1 TABLET ORAL
Qty: 10 | Refills: 0
Start: 2020-08-08 | End: 2020-08-12

## 2020-08-08 RX ORDER — CEFPODOXIME PROXETIL 100 MG
200 TABLET ORAL EVERY 12 HOURS
Refills: 0 | Status: DISCONTINUED | OUTPATIENT
Start: 2020-08-08 | End: 2020-08-08

## 2020-08-08 RX ADMIN — Medication 200 MILLIGRAM(S): at 00:48

## 2020-08-09 LAB
CULTURE RESULTS: SIGNIFICANT CHANGE UP
SPECIMEN SOURCE: SIGNIFICANT CHANGE UP

## 2020-08-10 LAB
C TRACH RRNA SPEC QL NAA+PROBE: SIGNIFICANT CHANGE UP
N GONORRHOEA RRNA SPEC QL NAA+PROBE: SIGNIFICANT CHANGE UP
SPECIMEN SOURCE: SIGNIFICANT CHANGE UP

## 2020-10-06 NOTE — ED ADULT NURSE NOTE - NS ED NURSE DC INFO COMPLEXITY
Solaraze Pregnancy And Lactation Text: This medication is Pregnancy Category B and is considered safe. There is some data to suggest avoiding during the third trimester. It is unknown if this medication is excreted in breast milk. Simple: Patient demonstrates quick and easy understanding

## 2020-11-17 ENCOUNTER — EMERGENCY (EMERGENCY)
Facility: HOSPITAL | Age: 40
LOS: 0 days | Discharge: HOME | End: 2020-11-17
Attending: STUDENT IN AN ORGANIZED HEALTH CARE EDUCATION/TRAINING PROGRAM | Admitting: EMERGENCY MEDICINE
Payer: MEDICAID

## 2020-11-17 VITALS
RESPIRATION RATE: 26 BRPM | HEIGHT: 65 IN | OXYGEN SATURATION: 100 % | TEMPERATURE: 97 F | SYSTOLIC BLOOD PRESSURE: 136 MMHG | DIASTOLIC BLOOD PRESSURE: 78 MMHG | HEART RATE: 107 BPM

## 2020-11-17 VITALS
RESPIRATION RATE: 18 BRPM | SYSTOLIC BLOOD PRESSURE: 114 MMHG | OXYGEN SATURATION: 99 % | HEART RATE: 73 BPM | DIASTOLIC BLOOD PRESSURE: 68 MMHG | TEMPERATURE: 98 F
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
## 2020-11-17 DIAGNOSIS — S86.899A OTHER INJURY OF OTHER MUSCLE(S) AND TENDON(S) AT LOWER LEG LEVEL, UNSPECIFIED LEG, INITIAL ENCOUNTER: Chronic | ICD-10-CM

## 2020-11-17 DIAGNOSIS — O09.522 SUPERVISION OF ELDERLY MULTIGRAVIDA, SECOND TRIMESTER: ICD-10-CM

## 2020-11-17 DIAGNOSIS — M25.551 PAIN IN RIGHT HIP: ICD-10-CM

## 2020-11-17 DIAGNOSIS — O09.529 SUPERVISION OF ELDERLY MULTIGRAVIDA, UNSPECIFIED TRIMESTER: ICD-10-CM
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
## 2020-11-17 DIAGNOSIS — M25.561 PAIN IN RIGHT KNEE: ICD-10-CM

## 2020-11-17 DIAGNOSIS — O9A.212 INJURY, POISONING AND CERTAIN OTHER CONSEQUENCES OF EXTERNAL CAUSES COMPLICATING PREGNANCY, SECOND TRIMESTER: ICD-10-CM

## 2020-11-17 DIAGNOSIS — M25.571 PAIN IN RIGHT ANKLE AND JOINTS OF RIGHT FOOT: ICD-10-CM

## 2020-11-17 LAB
ALBUMIN SERPL ELPH-MCNC: 4.1 G/DL — SIGNIFICANT CHANGE UP (ref 3.5–5.2)
ALP SERPL-CCNC: 70 U/L — SIGNIFICANT CHANGE UP (ref 30–115)
ALT FLD-CCNC: 24 U/L — SIGNIFICANT CHANGE UP (ref 0–41)
ANION GAP SERPL CALC-SCNC: 14 MMOL/L — SIGNIFICANT CHANGE UP (ref 7–14)
APTT BLD: 29 SEC — SIGNIFICANT CHANGE UP (ref 27–39.2)
AST SERPL-CCNC: 21 U/L — SIGNIFICANT CHANGE UP (ref 0–41)
BASOPHILS # BLD AUTO: 0.08 K/UL — SIGNIFICANT CHANGE UP (ref 0–0.2)
BASOPHILS NFR BLD AUTO: 0.7 % — SIGNIFICANT CHANGE UP (ref 0–1)
BILIRUB SERPL-MCNC: 0.3 MG/DL — SIGNIFICANT CHANGE UP (ref 0.2–1.2)
BLD GP AB SCN SERPL QL: SIGNIFICANT CHANGE UP
BUN SERPL-MCNC: 8 MG/DL — LOW (ref 10–20)
CALCIUM SERPL-MCNC: 9.4 MG/DL — SIGNIFICANT CHANGE UP (ref 8.5–10.1)
CHLORIDE SERPL-SCNC: 100 MMOL/L — SIGNIFICANT CHANGE UP (ref 98–110)
CO2 SERPL-SCNC: 21 MMOL/L — SIGNIFICANT CHANGE UP (ref 17–32)
CREAT SERPL-MCNC: 0.6 MG/DL — LOW (ref 0.7–1.5)
EOSINOPHIL # BLD AUTO: 0.18 K/UL — SIGNIFICANT CHANGE UP (ref 0–0.7)
EOSINOPHIL NFR BLD AUTO: 1.6 % — SIGNIFICANT CHANGE UP (ref 0–8)
ETHANOL SERPL-MCNC: <10 MG/DL — SIGNIFICANT CHANGE UP
GLUCOSE SERPL-MCNC: 89 MG/DL — SIGNIFICANT CHANGE UP (ref 70–99)
HCG SERPL-ACNC: HIGH MIU/ML
HCT VFR BLD CALC: 38.6 % — SIGNIFICANT CHANGE UP (ref 37–47)
HGB BLD-MCNC: 12.6 G/DL — SIGNIFICANT CHANGE UP (ref 12–16)
IMM GRANULOCYTES NFR BLD AUTO: 0.5 % — HIGH (ref 0.1–0.3)
INR BLD: 0.95 RATIO — SIGNIFICANT CHANGE UP (ref 0.65–1.3)
LACTATE SERPL-SCNC: 1.4 MMOL/L — SIGNIFICANT CHANGE UP (ref 0.7–2)
LIDOCAIN IGE QN: 26 U/L — SIGNIFICANT CHANGE UP (ref 7–60)
LYMPHOCYTES # BLD AUTO: 29.5 % — SIGNIFICANT CHANGE UP (ref 20.5–51.1)
LYMPHOCYTES # BLD AUTO: 3.24 K/UL — SIGNIFICANT CHANGE UP (ref 1.2–3.4)
MCHC RBC-ENTMCNC: 27.1 PG — SIGNIFICANT CHANGE UP (ref 27–31)
MCHC RBC-ENTMCNC: 32.6 G/DL — SIGNIFICANT CHANGE UP (ref 32–37)
MCV RBC AUTO: 83 FL — SIGNIFICANT CHANGE UP (ref 81–99)
MONOCYTES # BLD AUTO: 1.09 K/UL — HIGH (ref 0.1–0.6)
MONOCYTES NFR BLD AUTO: 9.9 % — HIGH (ref 1.7–9.3)
NEUTROPHILS # BLD AUTO: 6.35 K/UL — SIGNIFICANT CHANGE UP (ref 1.4–6.5)
NEUTROPHILS NFR BLD AUTO: 57.8 % — SIGNIFICANT CHANGE UP (ref 42.2–75.2)
NRBC # BLD: 0 /100 WBCS — SIGNIFICANT CHANGE UP (ref 0–0)
PLATELET # BLD AUTO: 318 K/UL — SIGNIFICANT CHANGE UP (ref 130–400)
POTASSIUM SERPL-MCNC: 3.9 MMOL/L — SIGNIFICANT CHANGE UP (ref 3.5–5)
POTASSIUM SERPL-SCNC: 3.9 MMOL/L — SIGNIFICANT CHANGE UP (ref 3.5–5)
PROT SERPL-MCNC: 7.2 G/DL — SIGNIFICANT CHANGE UP (ref 6–8)
PROTHROM AB SERPL-ACNC: 10.9 SEC — SIGNIFICANT CHANGE UP (ref 9.95–12.87)
RBC # BLD: 4.65 M/UL — SIGNIFICANT CHANGE UP (ref 4.2–5.4)
RBC # FLD: 13.9 % — SIGNIFICANT CHANGE UP (ref 11.5–14.5)
SODIUM SERPL-SCNC: 135 MMOL/L — SIGNIFICANT CHANGE UP (ref 135–146)
WBC # BLD: 10.99 K/UL — HIGH (ref 4.8–10.8)
WBC # FLD AUTO: 10.99 K/UL — HIGH (ref 4.8–10.8)

## 2020-11-17 PROCEDURE — 99285 EMERGENCY DEPT VISIT HI MDM: CPT

## 2020-11-17 PROCEDURE — 73610 X-RAY EXAM OF ANKLE: CPT | Mod: 26,RT

## 2020-11-17 PROCEDURE — 71045 X-RAY EXAM CHEST 1 VIEW: CPT | Mod: 26

## 2020-11-17 PROCEDURE — 76815 OB US LIMITED FETUS(S): CPT | Mod: 26

## 2020-11-17 PROCEDURE — 99282 EMERGENCY DEPT VISIT SF MDM: CPT

## 2020-11-17 PROCEDURE — 72170 X-RAY EXAM OF PELVIS: CPT | Mod: 26

## 2020-11-17 PROCEDURE — 73620 X-RAY EXAM OF FOOT: CPT | Mod: 26,RT

## 2020-11-17 PROCEDURE — 73552 X-RAY EXAM OF FEMUR 2/>: CPT | Mod: 26,RT

## 2020-11-17 PROCEDURE — 73562 X-RAY EXAM OF KNEE 3: CPT | Mod: 26,RT

## 2020-11-17 PROCEDURE — 99282 EMERGENCY DEPT VISIT SF MDM: CPT | Mod: 25

## 2020-11-17 RX ORDER — MORPHINE SULFATE 50 MG/1
8 CAPSULE, EXTENDED RELEASE ORAL ONCE
Refills: 0 | Status: DISCONTINUED | OUTPATIENT
Start: 2020-11-17 | End: 2020-11-17

## 2020-11-17 RX ORDER — SODIUM CHLORIDE 9 MG/ML
500 INJECTION INTRAMUSCULAR; INTRAVENOUS; SUBCUTANEOUS ONCE
Refills: 0 | Status: COMPLETED | OUTPATIENT
Start: 2020-11-17 | End: 2020-11-17

## 2020-11-17 RX ORDER — HYDROMORPHONE HYDROCHLORIDE 2 MG/ML
1 INJECTION INTRAMUSCULAR; INTRAVENOUS; SUBCUTANEOUS ONCE
Refills: 0 | Status: DISCONTINUED | OUTPATIENT
Start: 2020-11-17 | End: 2020-11-17

## 2020-11-17 RX ORDER — ONDANSETRON 8 MG/1
4 TABLET, FILM COATED ORAL ONCE
Refills: 0 | Status: COMPLETED | OUTPATIENT
Start: 2020-11-17 | End: 2020-11-17

## 2020-11-17 RX ADMIN — ONDANSETRON 4 MILLIGRAM(S): 8 TABLET, FILM COATED ORAL at 18:30

## 2020-11-17 RX ADMIN — HYDROMORPHONE HYDROCHLORIDE 1 MILLIGRAM(S): 2 INJECTION INTRAMUSCULAR; INTRAVENOUS; SUBCUTANEOUS at 13:45

## 2020-11-17 RX ADMIN — SODIUM CHLORIDE 500 MILLILITER(S): 9 INJECTION INTRAMUSCULAR; INTRAVENOUS; SUBCUTANEOUS at 13:19

## 2020-11-17 RX ADMIN — HYDROMORPHONE HYDROCHLORIDE 1 MILLIGRAM(S): 2 INJECTION INTRAMUSCULAR; INTRAVENOUS; SUBCUTANEOUS at 13:34

## 2020-11-17 RX ADMIN — MORPHINE SULFATE 8 MILLIGRAM(S): 50 CAPSULE, EXTENDED RELEASE ORAL at 12:20

## 2020-11-17 RX ADMIN — SODIUM CHLORIDE 500 MILLILITER(S): 9 INJECTION INTRAMUSCULAR; INTRAVENOUS; SUBCUTANEOUS at 14:00

## 2020-11-17 RX ADMIN — MORPHINE SULFATE 8 MILLIGRAM(S): 50 CAPSULE, EXTENDED RELEASE ORAL at 12:35

## 2020-11-17 NOTE — ED PROVIDER NOTE - PROGRESS NOTE DETAILS
AG: US team bedside  neg fast. OB paged at bedside. Authored by Dr. Rodarte: very difficult xray - pt refusing to move and lie flat AG: pt c/o persistent pain, observed lying on stretcher texting on phone, screaming in pain when applying ace wrap says "entire leg hurts." Unable to range leg. Ortho consult for further recs.   Per gyn os closed no bleeding fhr identified no further w/u on their end. Authored by Dr. Rodarte: agreeing to complete xrays. sitting up now Authored by Dr. Rodarte: s/o to dr mcmullen - reassess for ambulation and if not able, then admit for PT/rehab AG: pt with improving ROM, still not able to bear weight, insistent on going home. 1 episode emesis while ambulating, zofran given, pt able to tolerate PO. Extensive conversation w/ pt about risks of leaving and why she should stay, but pt not amenable to further w/u / PT / pain control / monitoring in hospital. Strict return precautions given and pt verbalizes understanding. Crutches given.

## 2020-11-17 NOTE — ED PROVIDER NOTE - NS ED ROS FT
Review of Systems:  	•	CONSTITUTIONAL - no fever, no diaphoresis  	•	SKIN - no rash, no lesions  	•	HEMATOLOGIC - no bleeding, no bruising  	•	EYES - no discharge, no injection  	•	ENT - no sore throat, no runny nose  	•	RESPIRATORY - no shortness of breath, no cough  	•	CARDIAC - no chest pain, no palpitations  	•	GI - no abd pain, no nausea, no vomiting, no diarrhea  	•	GENITO-URINARY - no dysuria, no hematuria  	•	MUSCULOSKELETAL - +joint pain, no muscle aches  	•	NEUROLOGIC - no dizziness, no headache

## 2020-11-17 NOTE — ED ADULT TRIAGE NOTE - BP NONINVASIVE SYSTOLIC (MM HG)
Reviewed last visit note from 6/11 :     Unable to wean off sertraline entirely due to exacerbation of anxiety.  Plan would be to start buspirone at 10 mg twice a day.  After a week of buspirone reduce sertraline to 25 mg a day and continue on this combination until seen in July.    Routed to Dr.Judith Lozano   Please see below from patient. Please advise, thank you!    136

## 2020-11-17 NOTE — ED PROVIDER NOTE - CLINICAL SUMMARY MEDICAL DECISION MAKING FREE TEXT BOX
CO- pt w/ nonsyncopal fall c/o R hip/knee/ankle pain. imaging performed and negative. pt in moderate pain.  pt starting to ambulate with assistance. admission again offered to pt for pain control/PT. pt insistent she does not want admission. pt states she wants crutches and has family who can help her at home. discussed risk of repeat fall/injury. pt understands but refuses admission and states she has assistance at home.

## 2020-11-17 NOTE — CONSULT NOTE ADULT - SUBJECTIVE AND OBJECTIVE BOX
40y f  40y f    TRAUMA ACTIVATION LEVEL:  2 alert    MECHANISM OF INJURY:   [] Blunt                            [] MVC                   [x] Fall	  [] Pedestrian Struck      [] Motorcycle       [] Assault     [] Bicycle collision          [] Sports injury     [] Penetrating  [] Gun Shot Wound 	 [] Stab Wound    GCS: 	E: 4	V: 5	M: 6      HPI:  40y old f , multiple prior ED visits for gyn complaints, minor extremity injuries, and also suffers from back pain, reported substance hx, presents to ED s/p fall down 3 steps, witnessed. Pt states she does not remember the fall, witnesses at scene state -HT, -LOC, -AC. Pt complains of severe right hip pain, and right foot pain. Also describes mild headache, abdominal pain, located primarily in the suprapubic region, and lower back pain, which she states she has prior to fall.     PAST MEDICAL & SURGICAL HISTORY:  No pertinent past medical history    Avulsion of patellar tendon  S/P surgical repair      Allergies    No Known Allergies    Intolerances        Home Medications:      ROS: 10-system review is otherwise negative except HPI above.      Primary Survey:    A - airway intact  B - bilateral breath sounds and good chest rise  C - palpable pulses in all extremities  D - GCS 15 on arrival, VALENTIN  Exposure obtained    Vital Signs Last 24 Hrs  T(C): 36 (2020 12:23), Max: 36 (2020 12:23)  T(F): 96.8 (2020 12:23), Max: 96.8 (2020 12:23)  HR: 107 (2020 12:23) (107 - 107)  BP: 136/78 (2020 12:23) (136/78 - 136/78)  BP(mean): --  RR: 26 (2020 12:23) (26 - 26)  SpO2: 100% (2020 12:23) (100% - 100%)    Secondary Survey:   General: NAD  HEENT: Normocephalic, atraumatic, EOMI, PEERLA. no scalp lacerations   Neck: Soft, midline trachea. no cspine tenderness  Chest: No chest wall tenderness. or subq  emphysema   Cardiac: S1, S2, RRR  Respiratory: Bilateral breath sounds, clear and equal bilaterally  Abdomen: Soft, non-distended, mild suprapubic tenderness, no rebound,   Groin: Normal appearing, pelvis stable R hip tenderness  Ext: palp radial b/l UE, b/l DP palp in Lower Extrem. R ankle swelling and tenderness  Back: lumbar TTP, no palpable runoff/stepoff/deformity  Rectal: No gladys blood, MICHAEL with good tone    FAST    Procedures:    LABS:  Labs:  CAPILLARY BLOOD GLUCOSE      POCT Blood Glucose.: 94 mg/dL (2020 12:27)                          12.6   10.99 )-----------( 318      ( 2020 12:33 )             38.6       Auto Neutrophil %: 57.8 % (20 @ 12:33)  Auto Immature Granulocyte %: 0.5 % (20 @ 12:33)        135  |  100  |  8<L>  ----------------------------<  89  3.9   |  21  |  0.6<L>      Calcium, Total Serum: 9.4 mg/dL (20 @ 12:33)      LFTs:             7.2  | 0.3  | 21       ------------------[70      ( 2020 12:33 )  4.1  | x    | 24          Lipase:26     Amylase:x         Lactate, Blood: 1.4 mmol/L (20 @ 12:33)      Coags:     10.90  ----< 0.95    ( 2020 12:33 )     29.0          Alcohol, Blood: <10 mg/dL (20 @ 12:33)        Alcohol, Blood: <10 mg/dL (20 @ 12:33)      RADIOLOGY & ADDITIONAL STUDIES:    ---------------------------------------------------------------------------------------

## 2020-11-17 NOTE — ED ADULT TRIAGE NOTE - CHIEF COMPLAINT QUOTE
"I'm 4 months pregnant. I tripped and fell on the concrete steps." As per EMT, pt fell x 3 steps, unsure if she lost consciousness, does not take any anticoagulant." Pt crying, c/o severe right ankle pain & back pain.

## 2020-11-17 NOTE — CONSULT NOTE ADULT - ASSESSMENT
ASSESSMENT:  40y old f , multiple prior ED visits for gyn complaints, minor extremity injuries, and also suffers from back pain, reported substance hx, presents to ED s/p fall down 3 steps, witnessed. Pt states she does not remember the fall, witnesses at scene state -HT, -LOC, -AC. Pt complains of severe right hip pain, and right foot pain. Also describes mild headache, abdominal pain, located primarily in the suprapubic region, and lower back pain, which she states she has prior to fall.     PLAN:    -XR R hip and R foot, chest and pelvis  -Gyn consult for ultrasound and monitoring  -FAST  
A/P: 41yo  at 16w3d GA, s/p mechanical fall, no signs or symptoms of placental abruption, not in  labor, reassuring fetal status  - no acute GYN intervention  - bleeding precautions  - pain management PRN  - f/u with OBGYN at next scheduled appointment  - dispo per ED    Dr. Burciaga and Dr. Ragland aware.

## 2020-11-17 NOTE — ED PROVIDER NOTE - ATTENDING CONTRIBUTION TO CARE
40 F, 4 months pregnant, s/p slip and fall 3-4 steps with pain to R side/hip/knee/ankle, pain with ROM. no abd pain. no vaginal bleeding, no vomiting. no sob.  vss, nontoxic, well appearing, + in pain, airway intact, GCS 15, PERRL, EOMI, MMM, no cervical-thoracic-lumbar spine tenderness, neck supple, CTAB, no crepitus over chest wall, RRR, equal radial pulses bilat, abd soft/nt/nd, no fnd. FROMx3, Dec ROM at R hip/knee/ankle with mild lat ankle swelling, no ecchymosis   - trauma alert on arrival  a/p: labs, imaging, reassess

## 2020-11-17 NOTE — ED PROVIDER NOTE - NSFOLLOWUPINSTRUCTIONS_ED_ALL_ED_FT
Please follow up with Dr Jordan 1-3 days for further evaluation. Return to the emergency department sooner for any new or worsening symptoms.    Fall Prevention in the Home, Adult  Falls can cause injuries. They can happen to people of all ages. There are many things you can do to make your home safe and to help prevent falls. Ask for help when making these changes, if needed.    What actions can I take to prevent falls?  General Instructions     Use good lighting in all rooms. Replace any light bulbs that burn out.  Turn on the lights when you go into a dark area. Use night-lights.  Keep items that you use often in easy-to-reach places. Lower the shelves around your home if necessary.  Set up your furniture so you have a clear path. Avoid moving your furniture around.  Do not have throw rugs and other things on the floor that can make you trip.  Avoid walking on wet floors.  If any of your floors are uneven, fix them.  Add color or contrast paint or tape to clearly cheryl and help you see:  Any grab bars or handrails.  First and last steps of stairways.  Where the edge of each step is.  If you use a stepladder:  Make sure that it is fully opened. Do not climb a closed stepladder.  Make sure that both sides of the stepladder are locked into place.  Ask someone to hold the stepladder for you while you use it.  If there are any pets around you, be aware of where they are.  What can I do in the bathroom?     Keep the floor dry. Clean up any water that spills onto the floor as soon as it happens.  Remove soap buildup in the tub or shower regularly.  Use non-skid mats or decals on the floor of the tub or shower.  Attach bath mats securely with double-sided, non-slip rug tape.  If you need to sit down in the shower, use a plastic, non-slip stool.  Install grab bars by the toilet and in the tub and shower. Do not use towel bars as grab bars.  What can I do in the bedroom?     Make sure that you have a light by your bed that is easy to reach.  Do not use any sheets or blankets that are too big for your bed. They should not hang down onto the floor.  Have a firm chair that has side arms. You can use this for support while you get dressed.  What can I do in the kitchen?     Clean up any spills right away.  If you need to reach something above you, use a strong step stool that has a grab bar.  Keep electrical cords out of the way.  Do not use floor polish or wax that makes floors slippery. If you must use wax, use non-skid floor wax.  What can I do with my stairs?     Do not leave any items on the stairs.  Make sure that you have a light switch at the top of the stairs and the bottom of the stairs. If you do not have them, ask someone to add them for you.  Make sure that there are handrails on both sides of the stairs, and use them. Fix handrails that are broken or loose. Make sure that handrails are as long as the stairways.  Install non-slip stair treads on all stairs in your home.  Avoid having throw rugs at the top or bottom of the stairs. If you do have throw rugs, attach them to the floor with carpet tape.  Choose a carpet that does not hide the edge of the steps on the stairway.  Check any carpeting to make sure that it is firmly attached to the stairs. Fix any carpet that is loose or worn.  What can I do on the outside of my home?     Use bright outdoor lighting.  Regularly fix the edges of walkways and driveways and fix any cracks.  Remove anything that might make you trip as you walk through a door, such as a raised step or threshold.  Trim any bushes or trees on the path to your home.  Regularly check to see if handrails are loose or broken. Make sure that both sides of any steps have handrails.  Install guardrails along the edges of any raised decks and porches.  Clear walking paths of anything that might make someone trip, such as tools or rocks.  Have any leaves, snow, or ice cleared regularly.  Use sand or salt on walking paths during winter.  Clean up any spills in your garage right away. This includes grease or oil spills.  What other actions can I take?     Wear shoes that:  Have a low heel. Do not wear high heels.  Have rubber bottoms.  Are comfortable and fit you well.  Are closed at the toe. Do not wear open-toe sandals.  Use tools that help you move around (mobility aids) if they are needed. These include:  Canes.  Walkers.  Scooters.  Crutches.  Review your medicines with your doctor. Some medicines can make you feel dizzy. This can increase your chance of falling.  Ask your doctor what other things you can do to help prevent falls.    Where to find more information  Centers for Disease Control and Prevention, [place]: https://cdc.gov  [employer] [place] [employer]: https://eo4gkzo.george.nih.gov  Contact a doctor if:  You are afraid of falling at home.  You feel weak, drowsy, or dizzy at home.  You fall at home.  Summary  There are many simple things that you can do to make your home safe and to help prevent falls.  Ways to make your home safe include removing tripping hazards and installing grab bars in the bathroom.  Ask for help when making these changes in your home.  This information is not intended to replace advice given to you by your health care provider. Make sure you discuss any questions you have with your health care provider.

## 2020-11-17 NOTE — ED PROVIDER NOTE - WET READ LAUNCH FT
There are no Wet Read(s) to document. There are 5 Wet Read(s) to document. There are 4 Wet Read(s) to document.

## 2020-11-17 NOTE — CONSULT NOTE ADULT - SUBJECTIVE AND OBJECTIVE BOX
Chief Complaint: fall    HPI: 39yo       Ob/Gyn History:  Ob: FT  X2, 7lb; G3:   at 27w GA; [id number] ( labor); G4:FT  X1; G5: FT NSVDx1, SABX4, no D&C; etopX2, no D&C   LMP - unknown             Denies history of ovarian cysts, uterine fibroids, abnormal paps, or STIs  Last Pap Smear -   Last Mammogram -   Last Colonoscopy -        Denies the following: constitutional symptoms, visual symptoms, cardiovascular symptoms, respiratory symptoms, GI symptoms, musculoskeletal symptoms, skin symptoms, neurologic symptoms, hematologic symptoms, allergic symptoms, psychiatric symptoms  Except any pertinent positives listed.     Home Medications:      Allergies    No Known Allergies    Intolerances        PAST MEDICAL & SURGICAL HISTORY:  No pertinent past medical history    Avulsion of patellar tendon  S/P surgical repair        FAMILY HISTORY:  Family history of hypertension (Father)        SOCIAL HISTORY: Denies cigarette use, alcohol use, or illicit drug use    Vital Signs Last 24 Hrs  T(F): 96.8 (2020 12:23), Max: 96.8 (2020 12:23)  HR: 107 (2020 12:23) (107 - 107)  BP: 136/78 (2020 12:23) (136/78 - 136/78)  RR: 26 (2020 12:23) (26 - 26)    General Appearance - AAOx3, NAD  Heart - S1S2 regular rate and rhythm  Lung - CTA Bilaterally  Abdomen - Soft, nontender, nondistended, no rebound, no rigidity, no guarding, bowel sounds present    GYN/Pelvis:    Labia Majora - Normal  Labia Minora - Normal  Clitoris - Normal  Urethra - Normal  Vagina - Normal  Cervix - Normal    Uterus:  Size - Normal  Tenderness - None  Mass - None  Freely mobile    Adnexa:  Masses - None  Tenderness - None      LABS:                        12.6   10.99 )-----------( 318      ( 2020 12:33 )             38.6     HCG Quantitative, Serum: 07867.0 mIU/mL (- @ 12:33)          135  |  100  |  8<L>  ----------------------------<  89  3.9   |  21  |  0.6<L>    Ca    9.4      2020 12:33    TPro  7.2  /  Alb  4.1  /  TBili  0.3  /  DBili  x   /  AST  21  /  ALT  24  /  AlkPhos  70  11-17    PT/INR - ( 2020 12:33 )   PT: 10.90 sec;   INR: 0.95 ratio         PTT - ( 2020 12:33 )  PTT:29.0 sec        RADIOLOGY & ADDITIONAL STUDIES:   Chief Complaint: fall    HPI: 39yo  at 16w3d GA EDC 2020 by LMP, presenting after a fall @1145 this morning. Reports she slipped down 3 concrete stairs and hit the right side of her body, reports pain is worst in her right hip, knee and ankle. Denies any head trauma, LOC, or abdominal trauma. Denies contractions, LOF, or vaginal bleeding. Has not yet felt fetal movement. Denies fever, chills, CP, SOB, N/V, severe abdominal pain, urinary symptoms or LE pain/swelling. Follows with a GYN at [facility].      Ob/Gyn History:  Ob: FT  X2, 7lb; G3:   at 27w GA; [id number] ( labor); G4:FT  X1; G5: FT NSVDx1, SABX4, no D&C; etopX2, no D&C   LMP - unknown             Denies history of ovarian cysts, uterine fibroids, abnormal paps, or STIs  Last Pap Smear -       Denies the following: constitutional symptoms, visual symptoms, cardiovascular symptoms, respiratory symptoms, GI symptoms, musculoskeletal symptoms, skin symptoms, neurologic symptoms, hematologic symptoms, allergic symptoms, psychiatric symptoms  Except any pertinent positives listed.     Home Medications:  None    Allergies  No Known Allergies        PAST MEDICAL & SURGICAL HISTORY:  No pertinent past medical history  Avulsion of patellar tendon  S/P surgical repair      FAMILY HISTORY:  Family history of hypertension (Father)      SOCIAL HISTORY: Denies cigarette use, alcohol use, or illicit drug use    Vital Signs Last 24 Hrs  T(F): 96.8 (2020 12:23), Max: 96.8 (2020 12:23)  HR: 107 (2020 12:23) (107 - 107)  BP: 136/78 (2020 12:23) (136/78 - 136/78)  RR: 26 (2020 12:23) (26 - 26)    General Appearance - AAOx3, NAD  Heart - S1S2 regular rate and rhythm  Lung - CTA Bilaterally  Abdomen - Soft, nontender, nondistended, no rebound, no rigidity, no guarding, bowel sounds present    GYN/Pelvis:      LABS:                        12.6   10.99 )-----------( 318      ( 2020 12:33 )             38.6     HCG Quantitative, Serum: 11159.0 mIU/mL (20 @ 12:33)          135  |  100  |  8<L>  ----------------------------<  89  3.9   |  21  |  0.6<L>    Ca    9.4      2020 12:33    TPro  7.2  /  Alb  4.1  /  TBili  0.3  /  DBili  x   /  AST  21  /  ALT  24  /  AlkPhos  70      PT/INR - ( 2020 12:33 )   PT: 10.90 sec;   INR: 0.95 ratio         PTT - ( 2020 12:33 )  PTT:29.0 sec        RADIOLOGY & ADDITIONAL STUDIES:   Chief Complaint: fall    HPI: 39yo  at 16w3d GA EDC 2020 by first trimester darrin, presenting after a fall @1145 this morning. Reports she slipped down 3 concrete stairs and hit the right side of her body, reports pain is worst in her right hip, knee and ankle. Denies any head trauma, LOC, or abdominal trauma. Denies contractions, LOF, or vaginal bleeding. Denies fever, chills, CP, SOB, N/V, severe abdominal pain, urinary symptoms or LE pain/swelling. Last PO intake @1130AM, last BM yesterday, last intercourse 1w ago. H/o  delivery @27w, scheduled to start IM progesterone this week. Denies any complications with this pregnancy. Follows with a GYN at [facility].      Ob/Gyn History:  Ob: FT  X2, 7lb; G3:   at 27w GA; [id number] ( labor); G4:FT  X1; G5: FT NSVDx1, SABX4, no D&C; etopX2, no D&C   LMP - unknown             H/o chlamydia in 2019, treated, BRANDAN neg. Denies history of ovarian cysts, uterine fibroids, abnormal paps, or other STIs  Last Pap Smear - 2019, normal per patient      Denies the following: constitutional symptoms, visual symptoms, cardiovascular symptoms, respiratory symptoms, GI symptoms, musculoskeletal symptoms, skin symptoms, neurologic symptoms, hematologic symptoms, allergic symptoms, psychiatric symptoms  Except any pertinent positives listed.     Home Medications:  None    Allergies  No Known Allergies        PAST MEDICAL & SURGICAL HISTORY:  No pertinent past medical history  Avulsion of patellar tendon  S/P surgical repair      FAMILY HISTORY:  Family history of hypertension (Father)      SOCIAL HISTORY: Denies cigarette use, alcohol use, or illicit drug use    Vital Signs Last 24 Hrs  T(F): 96.8 (2020 12:23), Max: 96.8 (2020 12:23)  HR: 107 (2020 12:23) (107 - 107)  BP: 136/78 (2020 12:23) (136/78 - 136/78)  RR: 26 (2020 12:23) (26 - 26)    General Appearance - AAOx3, NAD  Heart - S1S2 regular rate and rhythm  Lung - CTA Bilaterally  Abdomen - Soft, nontender, nondistended, no rebound, no rigidity, no guarding, bowel sounds present  Speculum: no lesions, no discharge, no bleeding, cervix appears closed  SVE: C/L/P  BSS: cephalic, ant placenta, MVP 5.5cm, +gross movements, FH 148bpm, CL 3.85cm      LABS:                        12.6   10.99 )-----------( 318      ( 2020 12:33 )             38.6     HCG Quantitative, Serum: 79354.0 mIU/mL (- @ 12:33)          135  |  100  |  8<L>  ----------------------------<  89  3.9   |  21  |  0.6<L>    Ca    9.4      2020 12:33    TPro  7.2  /  Alb  4.1  /  TBili  0.3  /  DBili  x   /  AST  21  /  ALT  24  /  AlkPhos  70      PT/INR - ( 2020 12:33 )   PT: 10.90 sec;   INR: 0.95 ratio         PTT - ( 2020 12:33 )  PTT:29.0 sec

## 2020-11-17 NOTE — ED PROVIDER NOTE - CARE PLAN
Principal Discharge DX:	Hip pain  Secondary Diagnosis:	Ankle pain  Secondary Diagnosis:	Knee pain  Secondary Diagnosis:	Trauma during pregnancy

## 2020-11-17 NOTE — ED PROVIDER NOTE - NSFOLLOWUPCLINICS_GEN_ALL_ED_FT
[facility] ([facility])  Rehabilitation  [facility] [address], [address] [place] [address]  Phone: [phone]  Fax:   Follow Up Time: Routine

## 2020-11-17 NOTE — ED PROVIDER NOTE - PATIENT PORTAL LINK FT
You can access the FollowMyHealth Patient Portal offered by [facility] by registering at the following website: [email or web address]. By joining [facility]’s FollowMyHealth portal, you will also be able to view your health information using other applications (apps) compatible with our system.

## 2020-11-17 NOTE — ED ADULT NURSE REASSESSMENT NOTE - NS ED NURSE REASSESS COMMENT FT1
Patient BIBA fall at home. Patient fell down three steps and is 3 months pregnant. Trauma alert initiated in triage @ 12:26. Patient is unaware of she had LOC and denies anticoagulant use. GCS=15. VS stable. C.o right ankle, knee, and back pain. Right AC 18 paced with labs drawn. Placed on cardiac monitor with pending xrays. FHR detected. Patient BIBA fall at home. Patient fell down three steps and is 3 months pregnant. Trauma alert initiated in triage @ 12:19. Patient is unaware of she had LOC and denies anticoagulant use. GCS=15. VS stable. C.o right ankle, knee, and back pain. Right AC 18 paced with labs drawn. Placed on cardiac monitor with pending xrays. FHR detected.

## 2020-11-17 NOTE — ED PROVIDER NOTE - OBJECTIVE STATEMENT
40yF G6 4mos pregnant BIBEMS after slip and fall down 3 steps; c/o RT hip, knee, and ankle pain; was not able to walk after fall and ROM severely limited due to pain. No recent illness; denies fever/chills, chest pain, SOB.

## 2020-11-17 NOTE — ED PROVIDER NOTE - CARE PROVIDER_API CALL
TERESO DESAI  [id number]  [address] [place] [address]  Phone: [phone]  Fax: [phone]  Established Patient  Follow Up Time: 1-3 Days

## 2020-11-17 NOTE — ED ADULT NURSE NOTE - OBJECTIVE STATEMENT
Patient BIBA fall at home. Patient fell down three steps and is 3 months pregnant. Trauma alert initiated in triage @ 12:26. Patient denies hitting head. Unaware if she had LOC and denies anticoagulant use. GCS=15. VS stable. C.o right ankle, knee, and back pain. Right AC 18 paced with labs drawn. Placed on cardiac monitor with pending xrays. FHR detected.

## 2020-11-17 NOTE — ED PROVIDER NOTE - PHYSICAL EXAMINATION
Vital Signs: Reviewed  GEN: alert, NAD, speaks full sentences  HEAD:  normocephalic, atraumatic  EYES:  PERRLA; conjunctivae without injection, drainage or discharge  ENMT:  tympanic membranes pearly gray with normal landmarks; nasal mucosa moist; mouth moist without ulcerations or lesions; throat moist without erythema, exudate, ulcerations or lesions  NECK:  supple  CARDIAC:  regular rate, normal S1 and S2, no murmurs  RESP:  respiratory rate and effort appear normal for age; lungs are clear to auscultation bilaterally; no rales or wheezes  ABDOMEN:  gravid abdomen, soft, non-tender  MUSCULOSKELETAL/NEURO: RT hip, knee tender to palpation; RT ankle tender w/ edema; RLE held in internal rotation; DPs 2+; chest wall stable pelvis stable no midline spinal tenderness; normal movement, normal tone  SKIN:  normal skin color for age and race, well-perfused; warm and dry

## 2021-06-17 NOTE — OB RN TRIAGE NOTE - PRO INTERPRETER NEED 2
Joi Aleman : 1947 MRN: [id number] DATE: 2021    DIAGNOSIS: 2 week follow up right total knee      SUBJECTIVE:Patient returns today for 2 week follow up of right total knee replacement. Patient reports doing well with no unusual complaints. Appears to be progressing appropriately.    OBJECTIVE:   Exam:. The incision is healing appropriately. No sign of infection. Range of motion is progressing as expected. The calf is soft and nontender with a negative Homans sign.    ASSESSMENT: 2 week status post right knee replacement.    PLAN: 1) Staples removed and steri strips applied   2) Order given for PT   3) Discontinue ANAHI hose   4) Continue ice PRN   5) aspirin 81 mg orally every day for 1 month   6) Follow up in 6 weeks with repeat Xrays of right knee (3views)    Shamar Boone MD  2021             
English

## 2021-07-15 NOTE — ED ADULT NURSE NOTE - NSSISCREENINGQ5_ED_A_ED
History of Eye Surgery  strabismus correction  S/P Laparoscopic Cholecystectomy  6/2010  S/P tonsillectomy and adenoidectomy  2011   No

## 2022-01-10 ENCOUNTER — OUTPATIENT (OUTPATIENT)
Dept: OUTPATIENT SERVICES | Facility: HOSPITAL | Age: 42
LOS: 1 days | Discharge: HOME | End: 2022-01-10

## 2022-01-10 DIAGNOSIS — Z20.828 CONTACT WITH AND (SUSPECTED) EXPOSURE TO OTHER VIRAL COMMUNICABLE DISEASES: ICD-10-CM

## 2022-01-10 DIAGNOSIS — S86.899A OTHER INJURY OF OTHER MUSCLE(S) AND TENDON(S) AT LOWER LEG LEVEL, UNSPECIFIED LEG, INITIAL ENCOUNTER: Chronic | ICD-10-CM

## 2022-01-10 LAB — SARS-COV-2 RNA SPEC QL NAA+PROBE: DETECTED

## 2022-02-14 ENCOUNTER — OUTPATIENT (OUTPATIENT)
Dept: OUTPATIENT SERVICES | Facility: HOSPITAL | Age: 42
LOS: 1 days | Discharge: HOME | End: 2022-02-14

## 2022-02-14 DIAGNOSIS — S86.899A OTHER INJURY OF OTHER MUSCLE(S) AND TENDON(S) AT LOWER LEG LEVEL, UNSPECIFIED LEG, INITIAL ENCOUNTER: Chronic | ICD-10-CM

## 2022-02-14 LAB — SARS-COV-2 RNA SPEC QL NAA+PROBE: SIGNIFICANT CHANGE UP

## 2022-03-29 ENCOUNTER — APPOINTMENT (OUTPATIENT)
Dept: NEUROSURGERY | Facility: CLINIC | Age: 42
End: 2022-03-29
Payer: SUBSIDIZED

## 2022-03-29 PROCEDURE — 99204 OFFICE O/P NEW MOD 45 MIN: CPT

## 2022-04-09 NOTE — ED ADULT NURSE NOTE - TEMPLATE LIST FOR HEAD TO TOE ASSESSMENT
OB/GYN PE- Well developed, well-nourish, resting comfortably in NAD.   Cardiac- +regular rate and rhythm.   ENT: No nasal trauma. No dental injuries. No facial crepitus. No bony tenderness.   Eyes- No trauma. Normal EOMI.   Pulm- No distress.  Neuro- A&Ox3, no gross sensory deficits to light touch or motor weaknesses.   Vasc- No peripheral edema or venous stasis noted.  Skin- + 1.25cm irregular superficial laceration of the left face lateral to the eye. minimal bleeding associated. No other facial trauma noted.   MS- no bony tenderness. Normal alignment.

## 2022-05-13 NOTE — ED ADULT TRIAGE NOTE - NS ED NOTE AC HIGH RISK COUNTRIES
MARIAH counseled pt on his use of alcohol. Pt is in the pre contemplation stage of change and not sure the level of treatment he wants after dc. He does report he wants to stop drinking. This writer called CATCH team to alert to see pt and they did. CM and CATCH team will continue to follow to assess pts level of motivation to change and make appropriate referrals.
No

## 2022-07-06 ENCOUNTER — APPOINTMENT (OUTPATIENT)
Dept: PAIN MANAGEMENT | Facility: CLINIC | Age: 42
End: 2022-07-06

## 2022-07-12 ENCOUNTER — APPOINTMENT (OUTPATIENT)
Dept: PAIN MANAGEMENT | Facility: CLINIC | Age: 42
End: 2022-07-12

## 2022-07-12 VITALS
DIASTOLIC BLOOD PRESSURE: 76 MMHG | WEIGHT: 198 LBS | HEART RATE: 83 BPM | HEIGHT: 64 IN | SYSTOLIC BLOOD PRESSURE: 126 MMHG | BODY MASS INDEX: 33.8 KG/M2

## 2022-07-12 DIAGNOSIS — M62.838 OTHER MUSCLE SPASM: ICD-10-CM

## 2022-07-12 DIAGNOSIS — Z78.9 OTHER SPECIFIED HEALTH STATUS: ICD-10-CM

## 2022-07-12 DIAGNOSIS — M47.816 SPONDYLOSIS W/OUT MYELOPATHY OR RADICULOPATHY, LUMBAR REGION: ICD-10-CM

## 2022-07-12 DIAGNOSIS — Z56.0 UNEMPLOYMENT, UNSPECIFIED: ICD-10-CM

## 2022-07-12 DIAGNOSIS — M54.6 PAIN IN THORACIC SPINE: ICD-10-CM

## 2022-07-12 DIAGNOSIS — M46.1 SACROILIITIS, NOT ELSEWHERE CLASSIFIED: ICD-10-CM

## 2022-07-12 LAB — COCAINE METAB.OTHER UR-MCNC: NEGATIVE

## 2022-07-12 PROCEDURE — 80305 DRUG TEST PRSMV DIR OPT OBS: CPT | Mod: QW

## 2022-07-12 PROCEDURE — 99214 OFFICE O/P EST MOD 30 MIN: CPT

## 2022-07-12 SDOH — ECONOMIC STABILITY - INCOME SECURITY: UNEMPLOYMENT, UNSPECIFIED: Z56.0

## 2022-07-12 NOTE — DISCUSSION/SUMMARY
[de-identified] : I have consulted the  registry for the purpose of reviewing the patient's controlled substance.\par \par Urine drug screening collected today with rapid sample result consistent with given regimen. Sample to be sent for confirmatory testing with additional relief at a later time.\par \par La Martinez PA-C \par Solange Dos Santos MD\par \par

## 2022-07-12 NOTE — REASON FOR VISIT
[Follow-Up Visit] : a follow-up pain management visit Oriented - self; Oriented - place; Oriented - time

## 2022-07-12 NOTE — PHYSICAL EXAM
[de-identified] : Neck: Palpation of the cervical spine is as follows: bilateral trapezial tenderness and left paracervical tenderness. Range of motion of the cervical spine is as follows: Pain at extremes of extension and rotation to left. Stiffness at extremes of flexion and rotation to right.   \par \par Back, including spine: Palpation of the thoracic/lumbar spine is as follows: right lumbar paraspinal tenderness and right SI joint tenderness. Range of motion of the thoracic and lumbar spine is as follows: pain at extremes of flexion, stiffness at extremes of flexion, pain at extremes extension and stiffness at extremes extentions. Special testing of the thoracic and lumbar spine is as follows: Garret testing is positive for reproduction of pain at the PSIS, and there is a postive Roque Finger test and a positive Gaenslen's test on the right. Gait and function is as follows: non-antalgic gait and patient ambulates without assistive device.

## 2022-07-12 NOTE — DATA REVIEWED
[FreeTextEntry1] : Imaging study review: MRI lumbar spine dated 08/30/2021: Impression: Levoscoliosis. Slight degenerative anterior spondylolisthesis is present at L4-5 with a diffuse annular bulge slightly compressing the ventral aspect of the thecal sac.  MRI cervical spine dated 08/24/2021: Impression: Mild dextroscoliosis with mild reversal of lordosis. Mild to moderate multilevel spondylosis without spinal stenosis. C4-5 central broad-based disc herniation mildly compressing the spinal cord extruded superiorly to the inferior endplate of C4. Mild bilateral foraminal narrowing. C5-6 left foraminal ridge disc severely constricting the foramen and C6 root. C6-7 bilateral small foraminal osteophytes.

## 2022-07-12 NOTE — HISTORY OF PRESENT ILLNESS
[FreeTextEntry1] : ORIGINAL PRESENTATION: Ms. Hanley is a 41 year old woman complaining of neck and back pain. She was initially evaluated at our walk in on 8/9/21, with regards to chief complaint of neck and lower back pain precipitated by a fall down the stairs she sustained back in November of 2020. At that time, the patient was pregnant and was seen in the emergency room following the fall. X-ray imaging was obtained which did not demonstrate any evidence of fractures or dislocation. Unfortunately ever since giving birth in April of this year, the patient states that her neck and lower back pain have markedly intensified. The pain is continuous in nature. With respect to patient neck pain, the pain radiates down the upper extremities bilaterally, with associated numbness in the thumb of the left hand. With regards to the back, initially she documented pain radiating into the lower however, now states pain is mainly localized. She has trialed Prednisone and Tizanidine and states she did not like how the Tizanidine made her feel. She did not take Gabapentin due to concerns of depression. Of note, She has not started physical therapy, however has an appointment on file to begin in next couple of weeks.\par \par The patient has had this pain for 9 months. Patient describes the pain as severe, 10/10 at its worst, 6/10 at its best, 8/10 currently. During the last month the pain has been constant with symptoms worsening in no typical pattern. Pain described as severe, aching. Pain is associated with numbness/pins and needles into the left thumb. Pain is increased with any activity. Bowel or bladder habits have not changed.\par \par ACTIVITIES: Patient could walk less than a block before the pain starts. Patient can sit 20 minutes before pain starts. Patient can stand 10 minutes before pain starts. The patient often lies down because of pain. Patient uses no assistive device at this time. Patient has difficulty going to work, performing household chores, doing outside work or shopping, socializing with friends, participating in recreational activities and exercising at this time.\par \par PRIOR PAIN TREATMENTS: No relief with exercise, heat treatment, cold treatment.\par \par Prior Pain Medications: Tylenol, aspirin, Motrin, naproxen, Lodine.\par \par TODAY: She presents for a revisit appointment. She continues to experience neck and lower back pain. Since her last visit, she saw Dr. Mcgregor and underwent a right SI joint injection. The injection was done on 6/3/22. She reports no relief at all after the injection. She continues to note right sided lower back pain.. She notes difficulty standing erect after arising from a seated/lying down to standing position and states when she is able to straighten out, she has difficulty walking. She has occasional pain into the right leg. She notes pain with sitting and states she often leans to the right as this provides her some relief when sitting. She notes pain into the right heel of her foot which she describes as a sensitivity. Patient was referred for flexion-extension x-rays of the lumbar spine, which she has not followed up with yet.\par \par In regards to the neck, pain remains unchanged in severity and distribution. She notes trouble sleeping at night, especially when laying on her right side. She denies any radiculopathy but notes pain with ROM and into her bilateral trapezius muscles. She notes intermittent paresthesais in her hands. Dr. Mcgregor recommended cervical injections in an effort to alleviate her pain. If she undergoes the injections without relief, a cervical fusion may be of benefit.\par \par Patient takes tizanidine and Diclofenac. She was prescribed amitriptyline, which she is not taking. She was also prescribed Tramadol, which was never filled for her. She states the pharmacy tried to contact our office and could not reach us. Her UDS was reviewed and a small amount of THC is noted. Patient states her daughter smokes marijuana. I advised her that I cannot prescribe her Tramadol until her UDS is negative.\par \par UDS was repeated today, 7/12/22 - see separate note.\par \par

## 2022-08-04 ENCOUNTER — APPOINTMENT (OUTPATIENT)
Dept: PAIN MANAGEMENT | Facility: CLINIC | Age: 42
End: 2022-08-04

## 2022-10-21 ENCOUNTER — APPOINTMENT (OUTPATIENT)
Dept: ORTHOPEDIC SURGERY | Facility: CLINIC | Age: 42
End: 2022-10-21

## 2022-10-21 VITALS — HEIGHT: 64 IN | WEIGHT: 198 LBS | BODY MASS INDEX: 33.8 KG/M2

## 2022-10-21 DIAGNOSIS — M72.2 PLANTAR FASCIAL FIBROMATOSIS: ICD-10-CM

## 2022-10-21 DIAGNOSIS — Z78.9 OTHER SPECIFIED HEALTH STATUS: ICD-10-CM

## 2022-10-21 DIAGNOSIS — M25.571 PAIN IN RIGHT ANKLE AND JOINTS OF RIGHT FOOT: ICD-10-CM

## 2022-10-21 PROCEDURE — 99213 OFFICE O/P EST LOW 20 MIN: CPT

## 2022-10-21 NOTE — DISCUSSION/SUMMARY
[de-identified] :   At this time I would like to again try to get an MRI of her right ankle.  I gave her script to start doing physical therapy.  She will call me after the MRI to go over the results.  Will schedule her follow-up for repeat evaluation.  Patient will call me if any other problems or concerns.  Patient verbalized understanding and agreed with the plan, all questions were answered in the office today.\par

## 2022-10-21 NOTE — HISTORY OF PRESENT ILLNESS
[de-identified] : Ms. Nancy Hanley, a 40-year-old female, presents today for evaluation of injury sustained to the right ankle on about November 17, 2020.  At that time she fell down the stairs and injured her ankle, she last saw Dr. Colon in February of 2021 but at the time she was pregnant and due in April. I saw her back in March and recommended she get a MRI of her ankle.  She has not had the MRI done.  She states she is still having pain in the ankle and at times has swelling and difficulty walking due to pain.  She states she has also been having pain in her heel.  She states gets worse when she 1st gets up out of bed in the morning.  She denies any new injury or trauma.  She denies any numbness tingling or any calf pain.

## 2022-10-21 NOTE — IMAGING
[de-identified] :   On examination of her right ankle she has no erythema, mild swelling over the lateral aspect of the ankle.  No ecchymosis.  She is nontender over the medial or lateral malleolus.  She is tender over the ATFL and CFL.  Mildly tender over the PT FL.  She is nontender over the talar dome.  No tenderness over the Achilles, negative Guzman's test, no calf tenderness.  She has some tenderness over the plantar aspect of the calcaneus.  No tenderness over the metatarsals or the Lisfranc joint.  She can dorsiflex and plantar flex, sensation is intact throughout, 2+ DP and PT pulses.

## 2022-10-28 ENCOUNTER — APPOINTMENT (OUTPATIENT)
Dept: MRI IMAGING | Facility: CLINIC | Age: 42
End: 2022-10-28

## 2022-11-10 ENCOUNTER — APPOINTMENT (OUTPATIENT)
Dept: NEUROSURGERY | Facility: CLINIC | Age: 42
End: 2022-11-10

## 2022-11-10 VITALS — WEIGHT: 187 LBS | HEIGHT: 65 IN | BODY MASS INDEX: 31.16 KG/M2

## 2022-11-10 DIAGNOSIS — M51.16 INTERVERTEBRAL DISC DISORDERS WITH RADICULOPATHY, LUMBAR REGION: ICD-10-CM

## 2022-11-10 DIAGNOSIS — M50.10 CERVICAL DISC DISORDER WITH RADICULOPATHY, UNSPECIFIED CERVICAL REGION: ICD-10-CM

## 2022-11-10 PROCEDURE — 99214 OFFICE O/P EST MOD 30 MIN: CPT

## 2022-11-10 NOTE — RESULTS/DATA
[FreeTextEntry1] : Imaging study review: MRI lumbar spine dated 08/30/2021: Impression: Levoscoliosis. Slight degenerative anterior\par spondylolisthesis is present at L4-5 with a diffuse annular bulge slightly compressing the ventral aspect of the thecal\par sac.\par MRI cervical spine dated 08/24/2021: Impression: Mild dextroscoliosis with mild reversal of lordosis. Mild to moderate\par multilevel spondylosis without spinal stenosis. C4-5 central broad-based disc herniation mildly compressing the spinal\par cord extruded superiorly to the inferior endplate of C4. Mild bilateral foraminal narrowing. C5-6 left foraminal ridge disc\par severely constricting the foramen and C6 root. C6-7 bilateral small foraminal osteophytes.

## 2022-11-10 NOTE — HISTORY OF PRESENT ILLNESS
[FreeTextEntry1] : Ms. LERNER is a 41 yo F who suffers from chronic neck and low back pain stemming from an accident in November 2020 where she fell down a flight of stairs. She was pregnant at the time and presented to the ER for imaging/testing which were without significant pathology. She had delivered her child in April of 2021 and had noted moderate-severe pain after that time and had presented for care through our office as well as through Pain Management. \par \par Neck pain persists with radicular features into the left upper extremity at times. Isolated neck pain remains most troublesome for which she continues with strengthening exercises at times but notes overall difficulty with sleeping due to pain and positioning that can exacerbate her pain. With regards to her lumbar pain, isolated low back pain noted along with buttock pain onto the right with posterior radicular features, mostly into the undersurface of the right foot.\par \par Physical therapy has not been done in quite some time; prior right SI joint provided mild benefit.\par \par PHYSICAL EXAM: \par \par Constitutional: Well appearing, no distress\par HEENT: Normocephalic Atraumatic\par Psychiatric: Alert and oriented to all spheres, normal mood\par Pulmonary: No respiratory distress\par \par Neurologic: \par CN II-XII grossly intact\par Palpation: diffuse tenderness to the cervical and lumbar paravertebral region\par Strength: strength intact bilateral upper extremities; RLE hip flexor/flexion\par Sensation: Full sensation to light touch in all extremities\par Reflexes: \par  2+ patellar\par  2+ biceps\par  2+ ankle jerk\par  No Haney's\par  No clonus\par  No babinski\par \par ROM limited in all motions\par \par SLR pos right lower ext\par Gait: steady, walking w/o assistance.\par

## 2022-11-10 NOTE — ASSESSMENT
[FreeTextEntry1] : Ms. LERNER returns for a revisit encounter with regards to chronic neck and low back pain. Updated MR C spine and L spine warranted for my review in order to suggest possible additional interventional options or to consider neurosurgical correction. Conservative efforts should be exhausted and a new requisition for physical therapy has been provided.\par \par She will complete the above mentioned efforts and will return to care in 8 weeks for continued care/treatment efforts.\par \par Lima Winchester PA-C

## 2022-11-17 ENCOUNTER — APPOINTMENT (OUTPATIENT)
Dept: PAIN MANAGEMENT | Facility: CLINIC | Age: 42
End: 2022-11-17

## 2022-11-17 NOTE — ED PROVIDER NOTE - NSTIMEPROVIDERCAREINITIATE_GEN_ER
Occupational Therapy  Facility/Department: [facility]  Occupational Therapy Initial Assessment    Name: Rachel Dutta  : 1957  MRN: [id number]  Date of Service: 2022    Discharge Recommendations: Rachel Dutta scored a 1424 on the AM-PAC ADL Inpatient form. Current research shows that an AM-PAC score of 18 or greater is typically associated with a discharge to the patient's home setting. Based on the patient's AM-PAC score, and their current ADL deficits, it is recommended that the patient have 2-3 sessions per week of Occupational Therapy at d/c to increase the patient's independence. At this time, this patient demonstrates the endurance and safety to discharge home with [facility] and a follow up treatment frequency of 2-3x/wk. Please see assessment section for further patient specific details. If patient discharges prior to next session this note will serve as a discharge summary. Please see below for the latest assessment towards goals. OT Equipment Recommendations  Equipment Needed: No  Other: defer to next level of care     Patient Diagnosis(es): Diagnoses of Thoracic myelopathy, Thoracic stenosis, and Spinal stenosis of lumbar region, unspecified whether neurogenic claudication present were pertinent to this visit. Past Medical History:  has a past medical history of Cerebral artery occlusion with cerebral infarction ([address] Diabetes mellitus ([address] Hyperlipidemia, Hypertension, and TIA (transient ischemic attack). Past Surgical History:  has a past surgical history that includes Cardiac surgery; Hand surgery; cervical fusion; shoulder surgery (Bilateral); Elbow surgery (Bilateral); back surgery; and Lumbar spine surgery (N/A, 11/15/2022). Treatment Diagnosis: impaired ADLs and functional mobility    Assessment   Performance deficits / Impairments: Decreased functional mobility ; Decreased endurance;Decreased ADL status; Decreased balance;Decreased strength;Decreased safe awareness;Decreased high-level IADLs    Assessment: Pt is 58 yo male from home with wife, presenting POD #2 from T5-T9 DECOMPRESSION WITH RESECTION OF EPIDURAL LIPOMATOSIS WITH L2-L5 DECOMPRESSION WITH RESECTION OF EPIDURAL LIPOMATOSIS. Pt requires assistance with LB ADLs and ambulates without AD at baseline. Today, pt perform functional transfer with CGA and ambulates with CGA and RW. Pt requires Max A for LB dressing and bathing, and Mod A for UE dressing. Pt is limited by significant pain this date. Recommend HHOT and initial 24h assistance/supervision. Will continue to follow on OT POC. Treatment Diagnosis: impaired ADLs and functional mobility  Prognosis: Fair  Decision Making: Medium Complexity  REQUIRES OT FOLLOW-UP: Yes  Activity Tolerance  Activity Tolerance: Patient limited by pain; Patient limited by fatigue;Treatment limited secondary to agitation        Plan   Occupational Therapy Plan  Times Per Week: 5-7  Times Per Day: Once a day  Current Treatment Recommendations: ROM, Balance training, Pain management, Self-Care / ADL, Return to work related activity, Functional mobility training, Safety education & training, Home management training, Endurance training, Neuromuscular re-education     Restrictions  Position Activity Restriction  Spinal Precautions: No Bending, No Twisting, No Lifting  Other position/activity restrictions: activity as tolerated, 2 drains in place, insulin pump    Subjective   General  Additional Pertinent Hx: 59 y.o. male with c/o chronic thoracic pain, lumbar pain  bilateral LE pain and weakness. Pt is POD #1 from T5-T9 DECOMPRESSION WITH RESECTION OF EPIDURAL LIPOMATOSIS WITH L2-L5 DECOMPRESSION WITH RESECTION OF EPIDURAL LIPOMATOSIS.  PMHx of TIAs, HTN, DM  Family / Caregiver Present: Yes (wife arrived mid-session)  Referring Practitioner: SASHA Molina  Diagnosis: Lumbar stenosis with neurogenic claudication  Subjective  Subjective: Pt met sidelying in bed, attempting to sit up to use urinal. Pt agrees to OT eval. Pt complains of 10/10 pain throughout session. 10/10 pain    Social/Functional History  Social/Functional History  Lives With: Spouse  Type of Home: House  Home Layout: Able to Live on Main level with bedroom/bathroom  Bathroom Shower/Tub: Walk-in shower  Bathroom Toilet: Standard  Bathroom Equipment:  (none)  Home Equipment:  (none)  ADL Assistance: Needs assistance (Wife helps put socks and shoes, pants on)  Homemaking Assistance: Needs assistance  Ambulation Assistance: Independent  Transfer Assistance: Needs assistance (pt reports wife provides assist with getting LEs into bed at home; otherwise, pt indep)  Active : Yes  Occupation: Part time employment  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  Additional Comments: wife works as RN on the weekends       Objective   O2 Device: None (Room air)    Observation/Palpation  Observation: 2 ARIEL drains    Balance  Sitting: With support (Pt leaning on walker while sitting EOB)  Standing: With support (BUE support via RW, CGA)    Gait  Overall Level of Assistance: Contact-guard assistance; Additional time; Adaptive equipment (with RW; increased time and effortful)  Interventions: Safety awareness training;Verbal cues  Speed/Christine: Slow  Distance (ft):  (pt ambulated in/out of restroom)  Assistive Device: Gait belt;Walker, rolling    Toilet Transfers  Toilet - Technique: Ambulating  Equipment Used: Standard toilet  Toilet Transfer: Contact guard assistance    AROM: Within functional limits  Strength: Within functional limits  Coordination: Within functional limits  Tone: Normal  Sensation: Intact    ADL  Grooming: Setup;Contact guard assistance;Verbal cueing  Grooming Skilled Clinical Factors: Pt brushed teeth standing at sink; washed face and applied deodorant at EOB  UE Bathing: Minimal assistance;Verbal cueing; Increased time to complete  UE Bathing Skilled Clinical Factors: Min A to wash back, pt 17-Nov-2020 12:30 wiped chest, arms, and underarms with bath wipes seated EOB  LE Bathing: Maximum assistance  LE Bathing Skilled Clinical Factors: Pt asked wife to wipe front and rear evie-area with bath wipes at EOB; OT wiped feet while pt supine  UE Dressing: Moderate assistance  UE Dressing Skilled Clinical Factors: Mod A to don/doff gown at EOB  LE Dressing: Maximum assistance  LE Dressing Skilled Clinical Factors: Max A to doff socks in supine  Toileting: Contact guard assistance  Toileting Skilled Clinical Factors: CGA to stand at toilet    Bed mobility  Supine to Sit: Stand by assistance (HOB slightly elevated)  Sit to Supine: Moderate assistance (BLE assistance; HOB slghtly elevated)  Scooting: Contact guard assistance (to EOB)    Transfers  Sit to stand: Contact guard assistance  Stand to sit: Contact guard assistance    Vision  Vision: Impaired  Vision Exceptions: Wears glasses at all times  Hearing  Hearing: Within functional limits    Cognition  Overall Cognitive Status: Exceptions  Following Commands: Follows one step commands with repetition  Attention Span: Attends with cues to redirect  Memory: Decreased recall of precautions  Safety Judgement: Decreased awareness of need for assistance;Decreased awareness of need for safety  Insights: Decreased awareness of deficits  Initiation: Requires cues for some  Sequencing: Requires cues for some  Cognition Comment: pt agitated with assistance throughout session, in a lot of pain and apologizing for \"being mean\"  Orientation  Overall Orientation Status: Within Functional Limits    Education Given To: Patient; Family  Education Provided: Role of Therapy;Plan of Care;Precautions; ADL Adaptive Strategies;Transfer Training  Education Method: Verbal;Demonstration  Barriers to Learning: Cognition  Education Outcome: Verbalized understanding;Continued education needed    AM-PAC Score  AM-PAC Inpatient Daily Activity Raw Score: 14 (11/17/22 1251)  AM-PAC Inpatient ADL T-Scale Score : 33.39 (11/17/22 1251)  ADL Inpatient CMS 0-100% Score: 59.67 (11/17/22 1251)  ADL Inpatient CMS G-Code Modifier : CK (11/17/22 1251)    Goals  Short Term Goals  Time Frame for Short Term Goals: by discharge  Short Term Goal 1: Pt will perform LB dressing with Min A and AE PRN  Short Term Goal 2: Pt will perform toilet transfer with SBA  Short Term Goal 3: Pt will perform log roll technique for supine to sit with supervision and no cues  Short Term Goal 4: Pt will stand 5 mins with SBA while completing grooming task  Patient Goals   Patient goals : to go home       Therapy Time   Individual Concurrent Group Co-treatment   Time In 1154         Time Out 1232         Minutes 38         Timed Code Treatment Minutes: 23 Minutes (+15 min OT eval)       Qian Laughlin, S/OT

## 2022-11-18 ENCOUNTER — APPOINTMENT (OUTPATIENT)
Dept: ORTHOPEDIC SURGERY | Facility: CLINIC | Age: 42
End: 2022-11-18

## 2022-11-22 ENCOUNTER — APPOINTMENT (OUTPATIENT)
Dept: ORTHOPEDIC SURGERY | Facility: CLINIC | Age: 42
End: 2022-11-22

## 2022-11-29 ENCOUNTER — EMERGENCY (EMERGENCY)
Facility: HOSPITAL | Age: 42
LOS: 0 days | Discharge: HOME | End: 2022-11-29
Attending: EMERGENCY MEDICINE | Admitting: EMERGENCY MEDICINE

## 2022-11-29 VITALS
RESPIRATION RATE: 18 BRPM | DIASTOLIC BLOOD PRESSURE: 65 MMHG | TEMPERATURE: 98 F | HEART RATE: 88 BPM | OXYGEN SATURATION: 99 % | SYSTOLIC BLOOD PRESSURE: 129 MMHG

## 2022-11-29 VITALS
DIASTOLIC BLOOD PRESSURE: 63 MMHG | TEMPERATURE: 102 F | RESPIRATION RATE: 18 BRPM | HEART RATE: 107 BPM | SYSTOLIC BLOOD PRESSURE: 138 MMHG | OXYGEN SATURATION: 100 %

## 2022-11-29 DIAGNOSIS — R00.0 TACHYCARDIA, UNSPECIFIED: ICD-10-CM

## 2022-11-29 DIAGNOSIS — S86.899A OTHER INJURY OF OTHER MUSCLE(S) AND TENDON(S) AT LOWER LEG LEVEL, UNSPECIFIED LEG, INITIAL ENCOUNTER: Chronic | ICD-10-CM

## 2022-11-29 DIAGNOSIS — Z20.822 CONTACT WITH AND (SUSPECTED) EXPOSURE TO COVID-19: ICD-10-CM

## 2022-11-29 DIAGNOSIS — B34.9 VIRAL INFECTION, UNSPECIFIED: ICD-10-CM

## 2022-11-29 DIAGNOSIS — Z28.310 UNVACCINATED FOR COVID-19: ICD-10-CM

## 2022-11-29 DIAGNOSIS — M79.18 MYALGIA, OTHER SITE: ICD-10-CM

## 2022-11-29 DIAGNOSIS — R53.83 OTHER FATIGUE: ICD-10-CM

## 2022-11-29 DIAGNOSIS — I10 ESSENTIAL (PRIMARY) HYPERTENSION: ICD-10-CM

## 2022-11-29 DIAGNOSIS — R50.9 FEVER, UNSPECIFIED: ICD-10-CM

## 2022-11-29 LAB
FLUAV AG NPH QL: SIGNIFICANT CHANGE UP
FLUBV AG NPH QL: SIGNIFICANT CHANGE UP
RSV RNA NPH QL NAA+NON-PROBE: SIGNIFICANT CHANGE UP
SARS-COV-2 RNA SPEC QL NAA+PROBE: SIGNIFICANT CHANGE UP

## 2022-11-29 PROCEDURE — 99284 EMERGENCY DEPT VISIT MOD MDM: CPT

## 2022-11-29 RX ORDER — SODIUM CHLORIDE 9 MG/ML
1000 INJECTION, SOLUTION INTRAVENOUS ONCE
Refills: 0 | Status: COMPLETED | OUTPATIENT
Start: 2022-11-29 | End: 2022-11-29

## 2022-11-29 RX ORDER — ACETAMINOPHEN 500 MG
975 TABLET ORAL ONCE
Refills: 0 | Status: COMPLETED | OUTPATIENT
Start: 2022-11-29 | End: 2022-11-29

## 2022-11-29 RX ORDER — IBUPROFEN 200 MG
1 TABLET ORAL
Qty: 28 | Refills: 0
Start: 2022-11-29 | End: 2022-12-05

## 2022-11-29 RX ORDER — KETOROLAC TROMETHAMINE 30 MG/ML
30 SYRINGE (ML) INJECTION ONCE
Refills: 0 | Status: DISCONTINUED | OUTPATIENT
Start: 2022-11-29 | End: 2022-11-29

## 2022-11-29 RX ADMIN — SODIUM CHLORIDE 1000 MILLILITER(S): 9 INJECTION, SOLUTION INTRAVENOUS at 14:33

## 2022-11-29 RX ADMIN — SODIUM CHLORIDE 1000 MILLILITER(S): 9 INJECTION, SOLUTION INTRAVENOUS at 12:35

## 2022-11-29 RX ADMIN — Medication 30 MILLIGRAM(S): at 12:37

## 2022-11-29 RX ADMIN — Medication 975 MILLIGRAM(S): at 12:36

## 2022-11-29 NOTE — ED PROVIDER NOTE - PROGRESS NOTE DETAILS
Continue medications as prescribed  Have your pharmacy contact us for a refill if you are running low on medications (We may ask you to come into clinic to get a refill from the nurse  No Alcohol or drug use  No driving if sedated  Call the clinic with any questions or concerns   Reach out for help if you feel like hurting yourself or others ([facility] [phone]: [address] [facility], [address] or [facility] Suicide Hotline [phone] , call 911 or go to nearest Emergency room    Follow up as directed, for your appointments, per your After Visit Summary Form.               KA: Pt no longer tachycardic or febrile. Will discuss dispo plan with patient. Pt with much improved vitals, received 3L hydration in ED as well. Reports improvement. Requesting motrin script

## 2022-11-29 NOTE — ED PROVIDER NOTE - PHYSICAL EXAMINATION
As Follows:  CONST: Well appearing in NAD  EYES: EOMI, Sclera and conjunctiva clear.  ENT: No nasal discharge. Oropharynx normal appearing, no erythema or exudates. Uvula midline. Dry mucous membranes.  CARD: Normal S1 S2; Normal rate and rhythm  RESP: Equal BS B/L, No wheezes, rhonchi or rales. No distress  SKIN: Warm, dry, no acute rashes. Good turgor. As Follows:  CONST:  Uncomfortable appearing, laying on stretcher under multiple blankets with eyes closed.  EYES: EOMI, Sclera and conjunctiva clear.  ENT: No nasal discharge. Oropharynx normal appearing, no erythema or exudates. Uvula midline. Dry mucous membranes.  CARD: Normal S1 S2; Normal rate and rhythm  RESP: Equal BS B/L, No wheezes, rhonchi or rales. No distress  SKIN: Warm, dry, no acute rashes. Good turgor.

## 2022-11-29 NOTE — ED PROVIDER NOTE - PATIENT PORTAL LINK FT
You can access the FollowMyHealth Patient Portal offered by [facility] by registering at the following website: [email or web address]. By joining [employer]’s FollowMyHealth portal, you will also be able to view your health information using other applications (apps) compatible with our system.

## 2022-11-29 NOTE — ED PROVIDER NOTE - CLINICAL SUMMARY MEDICAL DECISION MAKING FREE TEXT BOX
42-year-old female with a past medical history of hypertension presents with 3 days of fever, chills, myalgias, decreased appetite and p.o. intake.  Has been taking Tylenol at home.  Denies any nausea, vomiting, diarrhea, abdominal pain, shortness of breath, chest pain, neck stiffness or headache.  No rash.  Unvaccinated for COVID or influenza.  Presented given the constitutional symptoms and fever persisted.  On exam fatigued appearing but overall nontoxic, febrile, mildly tachycardic, blood pressure saturations stable.  No meningismus, no rash, lungs clear bilaterally, normal work of breathing, heart regular no murmurs, abdomen benign, no peripheral edema.  Given clinically slightly dehydrated and febrile given antipyretics and IV fluid rehydration.  Respiratory viral panel sent.  After reassessment patient felt improved.  Possible viral illness but ultimately no signs of pneumonia, clinically no signs of meningitis, and In my opinion, based on current evaluation and results, an acute medical or surgical emergency does not appear to be occurring at this time and I feel that the pt is stable for further outpatient work up and/or treatment. Return precautions given.

## 2022-11-29 NOTE — ED PROVIDER NOTE - OBJECTIVE STATEMENT
Pt is a 43 y/o female with PMHx of HTN presenting for 3 days of fever, fatigue, body aches, and loss of appetite/decreased fluid intake. Pt took 1000mg of Tylenol yesterday which lowered her at home temp reading of 104 to 102. She denies any aggravating/other alleviating factors. She also denies any N/V/D/C, abdominal pain, SoB, CP, or urinary complaints.   Pt does not follow a PCP. Pt is a 43 y/o female with PMHx of HTN presenting for 3 days of fever, fatigue, body aches, and loss of appetite/decreased fluid intake. Pt took 1000mg of Tylenol last night which lowered her at home temp reading of 104 to 102. She denies any aggravating/other alleviating factors. She also denies any N/V/D/C, abdominal pain, SoB, CP, or urinary complaints. Pt has not been vaccinated against flu or covid.  Pt does not follow a PCP.

## 2022-11-29 NOTE — ED PROVIDER NOTE - NS ED ROS FT
As follows:   CONST: + fever tmax 104 and body aches. No chills   EYES: No pain, redness, drainage or visual changes.  ENT: No ear pain or discharge, nasal discharge or congestion. No sore throat  CARD: No chest pain, palpitations  RESP: No SOB, cough, hemoptysis. No hx of asthma or COPD  GI: No abdominal pain, N/V/D  SKIN: No rashes

## 2022-11-29 NOTE — ED ADULT NURSE NOTE - OBJECTIVE STATEMENT
Pt reports to ed for Fever, chills, headache,  generalized weakness x4 days. Denies being in contact w/ anybody sick. States everybody she has been around is asymptomatic.

## 2022-11-29 NOTE — ED PROVIDER NOTE - NS ED ATTENDING STATEMENT MOD
This was a shared visit with the CELIA. I reviewed and verified the documentation and independently performed the documented:

## 2022-11-29 NOTE — ED ADULT NURSE NOTE - IS THE PATIENT ABLE TO BE SCREENED?
Reason for Call:  Form, our goal is to have forms completed with 72 hours, however, some forms may require a visit or additional information.    Type of letter, form or note:  medical    Who is the form from?: [employer] [facility] (if other please explain)    Where did the form come from: form was faxed in    What clinic location was the form placed at?: [facility]    Where the form was placed: Leann Gordon PA-C Box/Folder    What number is listed as a contact on the form?: Fax to [phone]       Call taken on 7/20/2021 at 12:39 PM by Yumiko Branham         Yes

## 2022-12-22 ENCOUNTER — APPOINTMENT (OUTPATIENT)
Dept: NEUROSURGERY | Facility: CLINIC | Age: 42
End: 2022-12-22

## 2023-01-03 ENCOUNTER — TRANSCRIPTION ENCOUNTER (OUTPATIENT)
Age: 43
End: 2023-01-03

## 2023-01-06 ENCOUNTER — APPOINTMENT (OUTPATIENT)
Dept: ORTHOPEDIC SURGERY | Facility: CLINIC | Age: 43
End: 2023-01-06

## 2023-01-20 NOTE — OB PROVIDER TRIAGE NOTE - NS_OBGYNHISTORY_OBGYN_ALL_OB_FT
Antibiotic ointment and bandaide  daily   Discontinue home health    Return in 2 weeks. ObHx: FT  X2, 7lb; G3:   at 27w GA; [id number] ( labor); G4:FT  X1; SABX4, no D&C; etopX2, no D&C     GynHx: h/o conservatively managed ovarian cyst; denies h/o fibroids, abnormal pap smears, STIs.

## 2023-01-31 ENCOUNTER — EMERGENCY (EMERGENCY)
Facility: HOSPITAL | Age: 43
LOS: 0 days | Discharge: HOME | End: 2023-01-31
Attending: EMERGENCY MEDICINE | Admitting: EMERGENCY MEDICINE
Payer: MEDICAID

## 2023-01-31 VITALS
TEMPERATURE: 99 F | HEART RATE: 76 BPM | DIASTOLIC BLOOD PRESSURE: 54 MMHG | RESPIRATION RATE: 18 BRPM | OXYGEN SATURATION: 99 % | SYSTOLIC BLOOD PRESSURE: 126 MMHG

## 2023-01-31 DIAGNOSIS — S86.899A OTHER INJURY OF OTHER MUSCLE(S) AND TENDON(S) AT LOWER LEG LEVEL, UNSPECIFIED LEG, INITIAL ENCOUNTER: Chronic | ICD-10-CM

## 2023-01-31 PROCEDURE — 99284 EMERGENCY DEPT VISIT MOD MDM: CPT

## 2023-01-31 RX ORDER — ACETAMINOPHEN 500 MG
650 TABLET ORAL ONCE
Refills: 0 | Status: COMPLETED | OUTPATIENT
Start: 2023-01-31 | End: 2023-01-31

## 2023-01-31 RX ORDER — IBUPROFEN 200 MG
600 TABLET ORAL ONCE
Refills: 0 | Status: COMPLETED | OUTPATIENT
Start: 2023-01-31 | End: 2023-01-31

## 2023-01-31 RX ADMIN — Medication 650 MILLIGRAM(S): at 12:57

## 2023-01-31 RX ADMIN — Medication 600 MILLIGRAM(S): at 12:58

## 2023-01-31 NOTE — ED PROVIDER NOTE - PHYSICAL EXAMINATION
CONSTITUTIONAL: NAD  SKIN: Warm dry  HEAD: NCAT  EYES: NL inspection  ENT: MMM, erythematous, enlarged tonsils.    NECK: Supple; non tender.  CARD: RRR  RESP: CTAB  ABD: S/NT no R/G  EXT: no pedal edema  NEURO: Grossly unremarkable  PSYCH: Cooperative, appropriate.

## 2023-01-31 NOTE — ED PROVIDER NOTE - PROGRESS NOTE DETAILS
PK: pt has enlarged, non erythematous tonsils, afebrile, tolerating PO, will dc with ENT follow up after ibuprofen and Tylenol.

## 2023-01-31 NOTE — ED PROVIDER NOTE - NSFOLLOWUPCLINICS_GEN_ALL_ED_FT
[facility]  ENT  [address], [address]nd [place] [address]  Phone: [phone]  Fax:   Follow Up Time: 1-3 Days

## 2023-01-31 NOTE — ED PROVIDER NOTE - PATIENT PORTAL LINK FT
You can access the FollowMyHealth Patient Portal offered by [facility] by registering at the following website: [email or web address]. By joining [employer]’s FollowMyHealth portal, you will also be able to view your health information using other applications (apps) compatible with our system.

## 2023-01-31 NOTE — ED PROVIDER NOTE - NSFOLLOWUPINSTRUCTIONS_ED_ALL_ED_FT
Our Emergency Department Referral Coordinators will be reaching out to you in the next 24-48 hours from 9:00am to 5:00pm with a follow up appointment. Please expect a phone call from the hospital in that time frame. If you do not receive a call or if you have any questions or concerns, you can reach them at   [phone]      Sore Throat  A sore throat is pain, burning, irritation, or scratchiness in the throat. When you have a sore throat, you may feel pain or tenderness in your throat when you swallow or talk.    Many things can cause a sore throat, including:  An infection.  Seasonal allergies.  Dryness in the air.  Irritants, such as smoke or pollution.  Gastroesophageal reflux disease (GERD).  A tumor.  A sore throat is often the first sign of another sickness. It may happen with other symptoms, such as coughing, sneezing, fever, and swollen neck glands. Most sore throats go away without medical treatment.    Follow these instructions at home:  Take over-the-counter medicines only as told by your health care provider.  Drink enough fluids to keep your urine clear or pale yellow.  Rest as needed.  To help with pain, try:  Sipping warm liquids, such as broth, herbal tea, or warm water.  Eating or drinking cold or frozen liquids, such as frozen ice pops.  Gargling with a salt-water mixture 3–4 times a day or as needed. To make a salt-water mixture, completely dissolve ½–1 tsp of salt in 1 cup of warm water.  Sucking on hard candy or throat lozenges.  Putting a cool-mist humidifier in your bedroom at night to moisten the air.  Sitting in the bathroom with the door closed for 5–10 minutes while you run hot water in the shower.  Do not use any tobacco products, such as cigarettes, chewing tobacco, and e-cigarettes. If you need help quitting, ask your health care provider.    Contact a health care provider if:  You have a fever for more than 2–3 days.  You have symptoms that last (are persistent) for more than 2–3 days.  Your throat does not get better within 7 days.  You have a fever and your symptoms suddenly get worse.  Get help right away if:  You have difficulty breathing.  You cannot swallow fluids, soft foods, or your saliva.  You have increased swelling in your throat or neck.  You have persistent nausea and vomiting.  This information is not intended to replace advice given to you by your health care provider. Make sure you discuss any questions you have with your health care provider

## 2023-01-31 NOTE — ED ADULT NURSE NOTE - PAIN: PRESENCE, MLM
Arrived to the [facility]. FOLFIRINOX completed. Patient tolerated without difficulty. Any issues or concerns during appointment: None. Fluorouracil chemo pump connected and infusing at 5ml/hr x 46 hrs. Clamps x 2 unclamped and verified by Jeaneth Bridges RN  Patient aware of next infusion appointment on 08/20/2022 (date) at 1400 (time). Patient instructed to call provider with temperature of 100.4 or greater or nausea/vomiting/ diarrhea or pain not controlled by medications  Discharged Ambulatory with family member.
complains of pain/discomfort

## 2023-01-31 NOTE — ED PROVIDER NOTE - CLINICAL SUMMARY MEDICAL DECISION MAKING FREE TEXT BOX
Chronic sore throat.  No dysphagia odynophagia.  No fever or chills.  Exam as noted.  Vital stable.  Needs outpatient follow-up

## 2023-02-01 DIAGNOSIS — J03.90 ACUTE TONSILLITIS, UNSPECIFIED: ICD-10-CM

## 2023-02-01 DIAGNOSIS — J02.9 ACUTE PHARYNGITIS, UNSPECIFIED: ICD-10-CM

## 2023-02-13 NOTE — ED PROVIDER NOTE - NSTIMEPROVIDERCAREINITIATE_GEN_ER
29-Nov-2022 12:01 Non-Graft Cartilage Fenestration Text: The cartilage was fenestrated with a 2mm punch biopsy to help facilitate healing.

## 2023-07-07 NOTE — ED ADULT TRIAGE NOTE - ARRIVAL FROM
Home
[FreeTextEntry1] : Pt presenting today for a f.u visit for OP. \par Last seen 01/2023. Chart reviewed since LV. \par Planned to get Prolia injection today. Repeat DEXA 11/2023\par No acute complaints today\par No fx, falls at home since LV. \par denies fever, chills, chest pain, chest palpitations, denies sob, denies nausea, vomiting, abdominal pain, rashes, photosensitivity, denies blood clots, raynauds

## 2023-09-30 NOTE — ED ADULT NURSE NOTE - FALL HARM RISK CONCLUSION
September 30, 2023      [facility]  [address] [facility] [place] [address]  Phone: [phone]  Fax: [phone]       Patient: Ashley Mckinley   YOB: 2003  Date of Visit: 09/30/2023    To Whom It May Concern:    Catina Mckinley  was at [facility] on 09/30/2023. The patient may return to work/school on 10/3/2023 with no restrictions. If you have any questions or concerns, or if I can be of further assistance, please do not hesitate to contact me.    Sincerely,    Melani Martinez LPN      Universal Safety Interventions

## 2023-10-04 NOTE — ED ADULT NURSE NOTE - NSFALLRSKOUTCOME_ED_ALL_ED
Recommend patient check the blood pressures at home we may be stopping one of the medications if they are running on the lower side on the time Fall with Harm Risk

## 2023-10-13 NOTE — ED PROVIDER NOTE - CARE PROVIDER_API CALL
CARE PROVIDER, YOUR PRIMARY  Phone: (   )    -  Fax: (   )    -  Follow Up Time:    Estlander Flap (Upper To Lower Lip) Text: The defect of the lower lip was assessed and measured.  Given the location and size of the defect, an Estlander flap was deemed most appropriate.  Using a sterile surgical marker, an appropriate Estlander flap was measured and drawn on the upper lip. Local anesthesia was then infiltrated. A scalpel was then used to incise the lateral aspect of the flap, through skin, muscle and mucosa, leaving the flap pedicled medially.  The flap was then rotated and positioned to fill the lower lip defect.  The flap was then sutured into place with a three layer technique, closing the orbicularis oris muscle layer with subcutaneous buried sutures, followed by a mucosal layer and an epidermal layer.

## 2024-02-07 NOTE — ED ADULT NURSE NOTE - PATIENT DISCHARGE SIGNATURE
Alert-The patient is alert, awake and responds to voice. The patient is oriented to time, place, and person. The triage nurse is able to obtain subjective information. 05-Sep-2018

## 2024-09-23 NOTE — ED PROVIDER NOTE - CROS ED NEURO ALL NEG
negative... no arthralgia/no arthritis/no joint swelling/no myalgia/no muscle cramps/no muscle weakness/no stiffness/no neck pain

## 2024-11-04 NOTE — ED PROVIDER NOTE - NS_EDPROVIDERDISPOUSERTYPE_ED_A_ED
Attending Attestation (For Attendings USE Only)... Assistance with ambulation/Assistance OOB with selected safe patient handling equipment/Communicate Risk of Fall with Harm to all staff/Discuss with provider need for PT consult/Monitor for mental status changes/Monitor gait and stability/Reinforce activity limits and safety measures with patient and family/Tailored Fall Risk Interventions/Toileting schedule using arm’s reach rule for commode and bathroom/Use of alarms - bed, chair and/or voice tab/Visual Cue: Yellow wristband and red socks/Bed in lowest position, wheels locked, appropriate side rails in place/Call bell, personal items and telephone in reach/Instruct patient to call for assistance before getting out of bed or chair/Non-slip footwear when patient is out of bed/[place] to call system/Physically safe environment - no spills, clutter or unnecessary equipment/Purposeful Proactive Rounding/Room/bathroom lighting operational, light cord in reach

## 2025-04-12 NOTE — ED ADULT TRIAGE NOTE - NSNEUBEH_NEU_P_CORE
Assessment complete. VSSA with no  c/o pain. All evening medications given. All needs addressed. Bed locked, I lowest position, with alarm on. Bedside table and call light within reach. Plan of care ongoing.    0305- pt had 35 beats of sustained Vtach. Pt asymptomatic, converted back to Afib with multiple pvcs. Note left for treatment team            no